# Patient Record
Sex: MALE | Race: WHITE | NOT HISPANIC OR LATINO | Employment: OTHER | URBAN - METROPOLITAN AREA
[De-identification: names, ages, dates, MRNs, and addresses within clinical notes are randomized per-mention and may not be internally consistent; named-entity substitution may affect disease eponyms.]

---

## 2019-10-16 ENCOUNTER — OFFICE VISIT (OUTPATIENT)
Dept: ORTHOPEDIC SURGERY | Facility: CLINIC | Age: 49
End: 2019-10-16

## 2019-10-16 VITALS
HEART RATE: 64 BPM | DIASTOLIC BLOOD PRESSURE: 86 MMHG | SYSTOLIC BLOOD PRESSURE: 125 MMHG | HEIGHT: 72 IN | BODY MASS INDEX: 30.75 KG/M2 | WEIGHT: 227 LBS

## 2019-10-16 DIAGNOSIS — M25.561 RIGHT KNEE PAIN, UNSPECIFIED CHRONICITY: Primary | ICD-10-CM

## 2019-10-16 DIAGNOSIS — M23.51 CHRONIC INSTABILITY OF RIGHT KNEE: ICD-10-CM

## 2019-10-16 PROBLEM — I10 ESSENTIAL HYPERTENSION: Status: ACTIVE | Noted: 2019-10-16

## 2019-10-16 PROBLEM — F41.9 ANXIETY: Status: ACTIVE | Noted: 2019-10-16

## 2019-10-16 PROBLEM — F43.10 PTSD (POST-TRAUMATIC STRESS DISORDER): Status: ACTIVE | Noted: 2019-10-16

## 2019-10-16 PROBLEM — J44.9 COPD (CHRONIC OBSTRUCTIVE PULMONARY DISEASE) (HCC): Status: ACTIVE | Noted: 2019-10-16

## 2019-10-16 PROCEDURE — 99203 OFFICE O/P NEW LOW 30 MIN: CPT | Performed by: FAMILY MEDICINE

## 2019-10-16 RX ORDER — CITALOPRAM 20 MG/1
20 TABLET ORAL DAILY
COMMUNITY
End: 2019-11-20

## 2019-10-16 RX ORDER — ROPINIROLE 5 MG/1
5 TABLET, FILM COATED ORAL NIGHTLY
COMMUNITY
End: 2019-12-04

## 2019-10-16 RX ORDER — NITROGLYCERIN 80 MG/1
1 PATCH TRANSDERMAL DAILY
COMMUNITY
End: 2019-11-20

## 2019-10-16 RX ORDER — CHOLECALCIFEROL (VITAMIN D3) 125 MCG
5 CAPSULE ORAL
COMMUNITY
End: 2020-01-29

## 2019-10-16 NOTE — PROGRESS NOTES
"Primary Care Sports Medicine Office Visit Note     Patient ID: Vince Urbina is a 49 y.o. male.    Chief Complaint:  Chief Complaint   Patient presents with   • Right Knee - Initial Evaluation     HPI:    Mr. Vince Urbina is a 49 y.o. male who presents to the clinic today for right knee pain and instability.  He states that he has no furniture at home and sits \" style\" frequently on the floor.  When getting up from this position, he has occasionally had some instability, which is causing him to fall back to the floor.  Denies any popping, clicking, or locking of the knee.  He denies any acute injury, or loud pop with any twisting injury or trauma.  He denies any previous history of injury to this knee.  Pain is deep and achy, points to the medial joint space.  He has attempted very rare naproxen over-the-counter, without any schedule or regularity.    Past Medical History:   Diagnosis Date   • Anxiety    • Asthma    • COPD (chronic obstructive pulmonary disease) (CMS/[facility])    • Essential hypertension 10/16/2019   • Heart attack (CMS/[facility])    • Hypertension    • Panic attack    • PTSD (post-traumatic stress disorder)    • Restless leg syndrome        Past Surgical History:   Procedure Laterality Date   • CATARACT EXTRACTION, BILATERAL     • EAR TUBES     • HAMMER TOE REPAIR     • TONSILLECTOMY AND ADENOIDECTOMY     • WRIST SURGERY Right        Family History   Problem Relation Age of Onset   • Hypertension Mother    • Diabetes Mother      Social History     Occupational History   • Not on file   Tobacco Use   • Smoking status: Never Smoker   Substance and Sexual Activity   • Alcohol use: No     Frequency: Never   • Drug use: Not on file   • Sexual activity: Not on file      Review of Systems   Constitutional: Negative for activity change and fever.   Musculoskeletal: Positive for arthralgias and gait problem (knee instability).   Skin: Negative for color change, rash and skin lesions.     Objective:    /86 (BP " "Location: Left arm, Patient Position: Sitting, Cuff Size: Adult)   Pulse 64   Ht 182.9 cm (72\")   Wt 103 kg (227 lb)   BMI 30.79 kg/m²     Physical Examination:  Physical Exam   Constitutional: He appears well-developed and well-nourished. No distress.   HENT:   Head: Normocephalic and atraumatic.   Eyes: Conjunctivae are normal.   Cardiovascular: Intact distal pulses.   Pulmonary/Chest: Effort normal. No respiratory distress.   Musculoskeletal:        Right knee: He exhibits no effusion.   Neurological: He is alert.   Skin: Skin is warm. Capillary refill takes less than 2 seconds. He is not diaphoretic.   Nursing note and vitals reviewed.    Right Ankle Exam     Range of Motion   The patient has normal right ankle ROM.      Right Knee Exam     Muscle Strength   The patient has normal right knee strength.    Tenderness   The patient is experiencing tenderness in the medial joint line and medial retinaculum (medial meniscal body).    Range of Motion   The patient has normal right knee ROM.  Extension: normal   Flexion: normal     Tests   Nataliia:  Medial - negative (moderate pain but no popping or clicking) Lateral - negative  Varus: negative Valgus: negative  Lachman:  Anterior - negative      Drawer:  Anterior - negative    Posterior - negative    Other   Erythema: absent  Sensation: normal (Distal to the knee. DP and PT palpable. )  Pulse: present  Swelling: none  Effusion: no effusion present      Right Hip Exam     Range of Motion   The patient has normal right hip ROM.          Imaging and other tests:  Three-view XR of the right knee shows well-preserved joint spaces, no osteophytic or sclerotic activity.  There is a moderate amount of hazy calcification in both medial and lateral joint spaces/likely meniscus of the right knee.  Otherwise no acute bony abnormality, no fracture or dislocation.    Assessment and Plan:    1. Right knee pain, unspecified chronicity  - XR Knee 3 View Right    2. Chronic " "instability of right knee    With calcification apparent on XR, I have no doubt that this patient likely has previous/chronic injury to menisci of this knee.  Currently without acute injury or trauma, I feel we should treat this conservatively.  We will move forward with hinged knee brace for stability, new prescription for meloxicam for anti-inflammatory benefit.  I would also like to see this patient attempted physical therapy, but he would rather trial knee brace first.  He can come back in 1 month to discuss Mobic efficacy, and we can consider physical therapy at that time.  Could also consider MRI for surgical evaluation if warranted.      Tung JACKSON \"Chance\" Zi ARAIZA DO, CAQSM  10/16/19  11:21 AM    Disclaimer: Please note that areas of this note were completed with computer voice recognition software.  Quite often unanticipated grammatical, syntax, homophones, and other interpretive errors are inadvertently transcribed by the computer software. Please excuse any errors that have escaped final proofreading.  "

## 2019-11-20 ENCOUNTER — OFFICE VISIT (OUTPATIENT)
Dept: ORTHOPEDIC SURGERY | Facility: CLINIC | Age: 49
End: 2019-11-20

## 2019-11-20 VITALS
DIASTOLIC BLOOD PRESSURE: 77 MMHG | WEIGHT: 223 LBS | HEART RATE: 80 BPM | HEIGHT: 72 IN | BODY MASS INDEX: 30.2 KG/M2 | SYSTOLIC BLOOD PRESSURE: 115 MMHG

## 2019-11-20 DIAGNOSIS — M23.51 CHRONIC INSTABILITY OF RIGHT KNEE: Primary | ICD-10-CM

## 2019-11-20 PROCEDURE — 99213 OFFICE O/P EST LOW 20 MIN: CPT | Performed by: FAMILY MEDICINE

## 2019-11-22 NOTE — PROGRESS NOTES
Primary Care Sports Medicine Office Visit Note     Patient ID: Vince Urbina is a 49 y.o. male.    Chief Complaint:  Chief Complaint   Patient presents with   • Right Knee - Follow-up     HPI:    Mr. Vince Urbina is a 49 y.o. male who presents to the clinic today for knee instability.  After being seen about a month ago, and attempting conservative measures, the patient still complains of instability of the knee.  He has been wearing a hinged knee brace for stability that seems to help.  He is taken meloxicam as instructed, and attempted some home physical therapy.  Unfortunately he denies any improvement in the instability.  He requests MRI.    Past Medical History:   Diagnosis Date   • Anxiety    • Asthma    • COPD (chronic obstructive pulmonary disease) (CMS/[facility])    • Essential hypertension 10/16/2019   • Heart attack (CMS/[facility])    • Hypertension    • Panic attack    • PTSD (post-traumatic stress disorder)    • Restless leg syndrome        Past Surgical History:   Procedure Laterality Date   • CATARACT EXTRACTION, BILATERAL     • EAR TUBES     • HAMMER TOE REPAIR     • TONSILLECTOMY AND ADENOIDECTOMY     • WRIST SURGERY Right        Family History   Problem Relation Age of Onset   • Hypertension Mother    • Diabetes Mother      Social History     Occupational History   • Not on file   Tobacco Use   • Smoking status: Never Smoker   Substance and Sexual Activity   • Alcohol use: No     Frequency: Never   • Drug use: Not on file   • Sexual activity: Not on file      Review of Systems   Constitutional: Negative for activity change and fever.   Respiratory: Negative for cough and shortness of breath.    Cardiovascular: Negative for chest pain.   Gastrointestinal: Negative for constipation, diarrhea, nausea and vomiting.   Musculoskeletal: Positive for arthralgias and gait problem.   Skin: Negative for color change and rash.   Neurological: Negative for weakness.   Hematological: Does not bruise/bleed easily.  "      Objective:    /77 (BP Location: Left arm, Patient Position: Sitting, Cuff Size: Adult)   Pulse 80   Ht 182.9 cm (72\")   Wt 101 kg (223 lb)   BMI 30.24 kg/m²     Physical Examination:  Physical Exam   Constitutional: He appears well-developed and well-nourished. No distress.   HENT:   Head: Normocephalic and atraumatic.   Eyes: Conjunctivae are normal.   Cardiovascular: Intact distal pulses.   Pulmonary/Chest: Effort normal. No respiratory distress.   Musculoskeletal:        Right knee: He exhibits no effusion.   Neurological: He is alert.   Skin: Skin is warm. Capillary refill takes less than 2 seconds. He is not diaphoretic.   Nursing note and vitals reviewed.    Right Ankle Exam     Range of Motion   The patient has normal right ankle ROM.      Right Knee Exam     Muscle Strength   The patient has normal right knee strength.    Tenderness   The patient is experiencing tenderness in the medial joint line.    Range of Motion   The patient has normal right knee ROM.  Extension: normal   Flexion: normal     Tests   Nataliia:  Medial - positive Lateral - negative  Varus: negative Valgus: negative  Lachman:  Anterior - negative      Drawer:  Anterior - negative    Posterior - negative    Other   Erythema: absent  Sensation: normal (Distal to the knee. DP and PT palpable. )  Pulse: present  Swelling: none  Effusion: no effusion present      Right Hip Exam     Range of Motion   The patient has normal right hip ROM.        Imaging and other tests:  No new imaging today.    Assessment and Plan:    1. Chronic instability of right knee  - MRI Knee Right Without Contrast; Future    With continued instability and calcific change on x-ray, we will go ahead move forward with MRI to further evaluate this patient's meniscal pathology.  I like to see him back in the next few days to discuss MRI results.      Tung JACKSON \"Chance\" Zi ARAIZA DO, CAQSM  11/22/19  9:14 AM    Disclaimer: Please note that areas of this note " were completed with computer voice recognition software.  Quite often unanticipated grammatical, syntax, homophones, and other interpretive errors are inadvertently transcribed by the computer software. Please excuse any errors that have escaped final proofreading.

## 2019-11-29 ENCOUNTER — HOSPITAL ENCOUNTER (OUTPATIENT)
Dept: MRI IMAGING | Facility: HOSPITAL | Age: 49
Discharge: HOME | End: 2019-11-29
Admitting: FAMILY MEDICINE

## 2019-11-29 DIAGNOSIS — M23.51 CHRONIC INSTABILITY OF RIGHT KNEE: ICD-10-CM

## 2019-11-29 PROCEDURE — 73721 MRI JNT OF LWR EXTRE W/O DYE: CPT

## 2019-12-04 ENCOUNTER — OFFICE VISIT (OUTPATIENT)
Dept: ORTHOPEDIC SURGERY | Facility: CLINIC | Age: 49
End: 2019-12-04

## 2019-12-04 VITALS
SYSTOLIC BLOOD PRESSURE: 142 MMHG | BODY MASS INDEX: 30.48 KG/M2 | WEIGHT: 225 LBS | DIASTOLIC BLOOD PRESSURE: 82 MMHG | HEIGHT: 72 IN | HEART RATE: 76 BPM

## 2019-12-04 DIAGNOSIS — M17.11 PRIMARY LOCALIZED OSTEOARTHROSIS OF RIGHT LOWER LEG: Primary | ICD-10-CM

## 2019-12-04 PROCEDURE — 99213 OFFICE O/P EST LOW 20 MIN: CPT | Performed by: FAMILY MEDICINE

## 2019-12-04 RX ORDER — ROPINIROLE 0.5 MG/1
1 TABLET, FILM COATED ORAL
Refills: 1 | COMMUNITY
Start: 2019-11-19 | End: 2020-11-09

## 2019-12-04 RX ORDER — CITALOPRAM 40 MG/1
40 TABLET ORAL DAILY
Refills: 3 | COMMUNITY
Start: 2019-11-25 | End: 2020-01-29

## 2019-12-04 NOTE — PROGRESS NOTES
Primary Care Sports Medicine Office Visit Note     Patient ID: Vince Urbina is a 49 y.o. male.    Chief Complaint:  Chief Complaint   Patient presents with   • Right Knee - Follow-up     HPI:    Mr. Vince Urbina is a 49 y.o. male who presents to the clinic today for MRI results.  The patient was fairly adamant previously that an MRI was warranted with some instability that he is having.  We discussed the findings on his x-ray of very mild joint space narrowing, but with some calcific change to medial and lateral menisci of the right knee.  He states his right knee continues to be symptomatic and is causing significant and severe amount of pain that is debilitating to his life.  He continues to claim there are both menisci and ligamentous problems of his knee.  We discussed that there are no ligamentous injuries to his knee on MRI however.  The patient is adamant that he needs to see a surgeon.    Past Medical History:   Diagnosis Date   • Anxiety    • Asthma    • COPD (chronic obstructive pulmonary disease) (CMS/[facility])    • Essential hypertension 10/16/2019   • Heart attack (CMS/[facility])    • Hypertension    • Panic attack    • PTSD (post-traumatic stress disorder)    • Restless leg syndrome        Past Surgical History:   Procedure Laterality Date   • CATARACT EXTRACTION, BILATERAL     • EAR TUBES     • HAMMER TOE REPAIR     • TONSILLECTOMY AND ADENOIDECTOMY     • WRIST SURGERY Right        Family History   Problem Relation Age of Onset   • Hypertension Mother    • Diabetes Mother      Social History     Occupational History   • Not on file   Tobacco Use   • Smoking status: Never Smoker   • Smokeless tobacco: Never Used   Substance and Sexual Activity   • Alcohol use: No     Frequency: Never   • Drug use: Defer   • Sexual activity: Defer      Review of Systems   Constitutional: Negative for activity change and fever.   Respiratory: Negative for cough and shortness of breath.    Cardiovascular: Negative for chest pain.  "  Gastrointestinal: Negative for constipation, diarrhea, nausea and vomiting.   Musculoskeletal: Positive for arthralgias.   Skin: Negative for color change and rash.   Neurological: Negative for weakness.   Hematological: Does not bruise/bleed easily.       Objective:    /82   Pulse 76   Ht 182.9 cm (72\")   Wt 102 kg (225 lb)   BMI 30.52 kg/m²     Physical Examination:  Physical Exam   Constitutional: He appears well-developed and well-nourished. No distress.   HENT:   Head: Normocephalic and atraumatic.   Eyes: Conjunctivae are normal.   Cardiovascular: Intact distal pulses.   Pulmonary/Chest: Effort normal. No respiratory distress.   Musculoskeletal:        Right knee: He exhibits no effusion.   Neurological: He is alert.   Skin: Skin is warm. Capillary refill takes less than 2 seconds. He is not diaphoretic.   Nursing note and vitals reviewed.    Right Knee Exam     Muscle Strength   The patient has normal right knee strength.    Tenderness   The patient is experiencing tenderness in the lateral joint line, medial joint line and patella.    Range of Motion   Right knee extension: mildly decreased end ROM.   Right knee flexion: mildly decreased end ROM.     Tests   Nataliia:  Medial - negative Lateral - negative  Varus: negative Valgus: negative  Right knee patellar apprehension test: +patellar grind, +mustapha figueredo.    Other   Erythema: absent  Sensation: normal  Pulse: present (distal to the knee, DP and PT palpable)  Swelling: none  Effusion: no effusion present          Imaging and other tests:  MRI right knee dated 11/20/2019 impression:   1. Tricompartmental osteoarthritis, most pronounced within the medial  compartment.  2. Degenerative appearing tearing of the medial meniscus involving the  body and the posterior horn..    Assessment and Plan:    1. Primary localized osteoarthrosis of right lower leg    I attempted to discuss with this patient his pathology, but he is unwilling to have this " "discussion as he is certain that there is a ligamentous injury in his knee. He was offered corticosteroid and HA injections both today that he denies. I tried multiple times to discuss the arthritic changes that are present only.  He does have some mild degenerative change of his meniscus as well.  He was unwavering in the fact that he needs to see a surgeon to discuss total knee replacement.  I am not convinced that this is the best treatment for him, but would be happy to get him with my surgical counterpart Dr. Liu Garcia in this office for surgical evaluation.  Should he feel that surgery is not yet an option, I be happy to see him again and repeat discussion on corticosteroid and/or hyaluronic acid injection for his osteoarthritic pathology.      Tung JACKSON \"Chance\" Zi ARAIZA DO, CAQSM  12/04/19  2:54 PM    Disclaimer: Please note that areas of this note were completed with computer voice recognition software.  Quite often unanticipated grammatical, syntax, homophones, and other interpretive errors are inadvertently transcribed by the computer software. Please excuse any errors that have escaped final proofreading.  "

## 2019-12-11 ENCOUNTER — OFFICE VISIT (OUTPATIENT)
Dept: ORTHOPEDIC SURGERY | Facility: CLINIC | Age: 49
End: 2019-12-11

## 2019-12-11 VITALS
HEART RATE: 83 BPM | HEIGHT: 72 IN | BODY MASS INDEX: 30.48 KG/M2 | SYSTOLIC BLOOD PRESSURE: 151 MMHG | DIASTOLIC BLOOD PRESSURE: 93 MMHG | WEIGHT: 225 LBS

## 2019-12-11 DIAGNOSIS — M17.0 BILATERAL PRIMARY OSTEOARTHRITIS OF KNEE: Primary | ICD-10-CM

## 2019-12-11 PROCEDURE — 99213 OFFICE O/P EST LOW 20 MIN: CPT | Performed by: ORTHOPAEDIC SURGERY

## 2019-12-11 NOTE — PROGRESS NOTES
"     Patient ID: Vince Urbina is a 49 y.o. male.  Bilateral knee pain  Vince is a 49-year-old gentleman I partner is seen for knee pain that is been present for most of this year.  There is no injury.  He has activity related knee pain in both knees which is worse than the right.  They swell at times.  No real mechanical complaint.  They have not responded to anti-inflammatories or rest.  Pain is down to 4/10    Review of Systems:  Bilateral knee pain  Denies chest pain      Objective:    /93   Pulse 83   Ht 182.9 cm (72\")   Wt 102 kg (225 lb)   BMI 30.52 kg/m²     Physical Examination:   He is a pleasant male in no distress. He is alert and oriented x3 and appears his stated age.  Both knees demonstrate no scars and no atrophy.  There are no effusions.  He has mild medial joint line tenderness worse on the right than the left.  Knee range of motion bilaterally 0 to 135 degrees with no instability negative Nataliia.Sensory and motor exam are intact all distributions. Dorsalis pedis and posterior tibialis pulses are palpable and capillary refill is less than two seconds to all digits      Imaging:   x-rays demonstrate well-maintained joint spaces of both knees with chondrocalcinosis of the right knee  MRI of the right knee demonstrates mild to moderate degenerative joint disease with a small medial meniscus tear    Assessment:    Bilateral knee pain and degenerative joint disease    Plan:  Options discussed.  I recommend Visco supplementation in both knees          Disclaimer: Please note that areas of this note were completed with computer voice recognition software.  Quite often unanticipated grammatical, syntax, homophones, and other interpretive errors are inadvertently transcribed by the computer software. Please excuse any errors that have escaped final proofreading.  "

## 2019-12-16 ENCOUNTER — TELEPHONE (OUTPATIENT)
Dept: ORTHOPEDIC SURGERY | Facility: CLINIC | Age: 49
End: 2019-12-16

## 2019-12-16 NOTE — TELEPHONE ENCOUNTER
Called to check status on SOB for monovisc, they needed more info about his medicaid.  She stated that she would call right away to check it.

## 2019-12-17 NOTE — TELEPHONE ENCOUNTER
Called patient went over SOB and wants  to go ahead with monovisc inj for both knees. Transferred patient to make appointment. Will order injs.

## 2019-12-26 ENCOUNTER — OFFICE VISIT (OUTPATIENT)
Dept: ORTHOPEDIC SURGERY | Facility: CLINIC | Age: 49
End: 2019-12-26

## 2019-12-26 VITALS
DIASTOLIC BLOOD PRESSURE: 103 MMHG | SYSTOLIC BLOOD PRESSURE: 136 MMHG | BODY MASS INDEX: 30.48 KG/M2 | HEIGHT: 72 IN | HEART RATE: 96 BPM | WEIGHT: 225 LBS

## 2019-12-26 DIAGNOSIS — M17.0 BILATERAL PRIMARY OSTEOARTHRITIS OF KNEE: Primary | ICD-10-CM

## 2019-12-26 PROCEDURE — 20610 DRAIN/INJ JOINT/BURSA W/O US: CPT | Performed by: ORTHOPAEDIC SURGERY

## 2019-12-26 NOTE — PROGRESS NOTES
"     Patient ID: Vince Urbina is a 49 y.o. male.    Bilateral knee pain    Objective:    BP (!) 136/103   Pulse 96   Ht 182.9 cm (72\")   Wt 102 kg (225 lb)   BMI 30.52 kg/m²     Physical Examination:    Both knees demonstrate no redness trace effusion    Imaging:      Assessment:  Bilateral knee degenerative joint disease    Plan:  Risks and benefits of the injection were discussed. Under sterile technique and written consent I injected one syringe of monovisc into each knee. It was well tolerated. Postinjection instructions were given  "

## 2020-01-29 ENCOUNTER — OFFICE VISIT (OUTPATIENT)
Dept: ORTHOPEDIC SURGERY | Facility: CLINIC | Age: 50
End: 2020-01-29

## 2020-01-29 VITALS
DIASTOLIC BLOOD PRESSURE: 87 MMHG | WEIGHT: 220 LBS | BODY MASS INDEX: 29.8 KG/M2 | SYSTOLIC BLOOD PRESSURE: 131 MMHG | HEART RATE: 73 BPM | HEIGHT: 72 IN

## 2020-01-29 DIAGNOSIS — M25.561 ACUTE PAIN OF RIGHT KNEE: Primary | ICD-10-CM

## 2020-01-29 PROCEDURE — 99213 OFFICE O/P EST LOW 20 MIN: CPT | Performed by: ORTHOPAEDIC SURGERY

## 2020-01-29 NOTE — PROGRESS NOTES
"     Patient ID: Vince Urbina is a 49 y.o. male.  Knee pain  Vince has had resolution of his left knee pain after injections, his right knee continues to feel a little bit weak with giving out.  He has suffered a couple falls on the stairs.  He was in an immobilizer.  Most of the pain is over the medial joint space    Review of Systems:    Right knee pain  Denies chest pain    Objective:    /87 (BP Location: Right arm, Patient Position: Sitting, Cuff Size: Large Adult)   Pulse 73   Ht 182.9 cm (72\")   Wt 99.8 kg (220 lb)   BMI 29.84 kg/m²     Physical Examination:   He is a pleasant male in no distress. He is alert and oriented x3 and appears his stated age.  Right knee demonstrates no effusion, mild medial joint line tenderness.  Range of motion 20 to 120 degrees with intact extensor mechanism and no varus or valgus instability.  Lachman, anterior, posterior drawer negative with negative Nataliia's.  Sensory and motor exam are intact all distributions. Dorsalis pedis and posterior tibialis pulses are palpable and capillary refill is less than two seconds to all digits      Imaging:       Assessment:    Right knee pain and arthritis    Plan:  I recommend a knee brace as well as physical therapy.  See me in 6 weeks        Disclaimer: Please note that areas of this note were completed with computer voice recognition software.  Quite often unanticipated grammatical, syntax, homophones, and other interpretive errors are inadvertently transcribed by the computer software. Please excuse any errors that have escaped final proofreading.  "

## 2020-02-03 ENCOUNTER — APPOINTMENT (OUTPATIENT)
Dept: GENERAL RADIOLOGY | Facility: HOSPITAL | Age: 50
End: 2020-02-03

## 2020-02-03 ENCOUNTER — HOSPITAL ENCOUNTER (EMERGENCY)
Facility: HOSPITAL | Age: 50
Discharge: HOME | End: 2020-02-03
Admitting: EMERGENCY MEDICINE

## 2020-02-03 VITALS
WEIGHT: 219.14 LBS | RESPIRATION RATE: 16 BRPM | HEART RATE: 77 BPM | BODY MASS INDEX: 29.68 KG/M2 | HEIGHT: 72 IN | TEMPERATURE: 98.2 F | OXYGEN SATURATION: 98 % | SYSTOLIC BLOOD PRESSURE: 135 MMHG | DIASTOLIC BLOOD PRESSURE: 99 MMHG

## 2020-02-03 DIAGNOSIS — M25.521 RIGHT ELBOW PAIN: Primary | ICD-10-CM

## 2020-02-03 DIAGNOSIS — M79.631 RIGHT FOREARM PAIN: ICD-10-CM

## 2020-02-03 PROCEDURE — 99283 EMERGENCY DEPT VISIT LOW MDM: CPT

## 2020-02-03 PROCEDURE — 73090 X-RAY EXAM OF FOREARM: CPT

## 2020-02-03 RX ORDER — CITALOPRAM 10 MG/1
10 TABLET ORAL DAILY
COMMUNITY
End: 2022-07-12

## 2020-02-03 RX ORDER — IBUPROFEN 400 MG/1
800 TABLET ORAL ONCE
Status: COMPLETED | OUTPATIENT
Start: 2020-02-03 | End: 2020-02-03

## 2020-02-03 RX ADMIN — IBUPROFEN 800 MG: 400 TABLET ORAL at 20:50

## 2020-02-04 NOTE — DISCHARGE INSTRUCTIONS
Wear shoulder sling for the next 3 to 5 days as needed for pain.  Perform range of motion exercises 2-3 times daily.  Apply ice to your right elbow for 20 minutes at a time for the neck 72 hours help with pain and swelling.  Take ibuprofen as needed for pain.    Follow-up with your primary care provider in 3-5 days.  If you do not have a primary care provider call [phone] for help in finding one, or you may follow up with [facility] at [phone].    Return to ED for any new or worsening symptoms    Follow-up with orthopedics as listed below if your pain continues

## 2020-02-04 NOTE — ED TRIAGE NOTES
Pt reports had rt arm closed in car door, c/o pain from elbow to wrist with tingling noted to elbow.

## 2020-02-04 NOTE — ED PROVIDER NOTES
Subjective   History of Present Illness  Patient is a 49-year-old male who presents with complaints of right elbow to wrist pain after his arm was caught in a car door just prior to arrival to the ED.  Patient describes his pain as a sharp shooting pain that he rates a 9/10 severity.  Reports some associated paresthesias in his arm at times.  Worse with certain movements better with rest denies any weakness.  right wrist surgery in March 2019.  States he is taken no medication for the pain denies any other alleviating or exacerbating factors.  Denies any chest pain or shortness of breath  Review of Systems   Constitutional: Negative.    Respiratory: Negative.    Cardiovascular: Negative.    Musculoskeletal: Positive for arthralgias and myalgias. Negative for joint swelling, neck pain and neck stiffness.   Skin: Negative.    Neurological: Positive for numbness. Negative for dizziness, seizures, facial asymmetry, weakness, light-headedness and headaches.       Past Medical History:   Diagnosis Date   • Anxiety    • Asthma    • COPD (chronic obstructive pulmonary disease) (CMS/[facility])    • Essential hypertension 10/16/2019   • Heart attack (CMS/[facility])    • Hypertension    • Panic attack    • PTSD (post-traumatic stress disorder)    • Restless leg syndrome        Allergies   Allergen Reactions   • Codeine Shortness Of Breath   • Aspirin GI Intolerance   • Ciprofloxacin Nausea And Vomiting   • Dilaudid [Hydromorphone] Anxiety and Palpitations   • Toradol [Ketorolac Tromethamine] Anxiety and Palpitations   • Tramadol Anxiety and Palpitations       Past Surgical History:   Procedure Laterality Date   • CATARACT EXTRACTION, BILATERAL     • EAR TUBES     • HAMMER TOE REPAIR     • TONSILLECTOMY AND ADENOIDECTOMY     • WRIST SURGERY Right        Family History   Problem Relation Age of Onset   • Hypertension Mother    • Diabetes Mother        Social History     Socioeconomic History   • Marital status: Legally      Spouse  name: Not on file   • Number of children: Not on file   • Years of education: Not on file   • Highest education level: Not on file   Tobacco Use   • Smoking status: Never Smoker   • Smokeless tobacco: Never Used   Substance and Sexual Activity   • Alcohol use: No     Frequency: Never   • Drug use: Defer   • Sexual activity: Defer           Objective   Physical Exam   Constitutional: He is oriented to person, place, and time. He appears well-developed and well-nourished. No distress.   HENT:   Head: Normocephalic and atraumatic.   Mouth/Throat: Oropharynx is clear and moist.   Eyes: Pupils are equal, round, and reactive to light. EOM are normal. No scleral icterus.   Cardiovascular: Normal rate, regular rhythm, normal heart sounds and intact distal pulses. Exam reveals no gallop and no friction rub.   No murmur heard.  Pulmonary/Chest: Effort normal and breath sounds normal. No stridor. He has no wheezes. He has no rales.   Musculoskeletal: He exhibits tenderness. He exhibits no edema or deformity.        Right elbow: Tenderness found.        Right wrist: He exhibits tenderness.        Right forearm: He exhibits tenderness.   Decreased range of motion with flexion extension supination and pronation of right elbow, forearm, wrist secondary to pain there is no overlying erythema edema or warmth noted no laceration noted.  Peripheral pulses intact compartment soft.  Radial medial ulnar nerve intact normal sensation throughout    Normal range of motion of right hand with no overlying erythema edema or warmth noted   Neurological: He is alert and oriented to person, place, and time. No cranial nerve deficit or sensory deficit.   Skin: Skin is warm. Capillary refill takes less than 2 seconds. No rash noted. He is not diaphoretic. No erythema. No pallor.   Psychiatric: He has a normal mood and affect. His behavior is normal.   Nursing note and vitals reviewed.      Procedures           ED Course    /96   Pulse 74    "Temp 98.3 °F (36.8 °C) (Oral)   Resp 18   Ht 182.9 cm (72\")   Wt 99.4 kg (219 lb 2.2 oz)   SpO2 99%   BMI 29.72 kg/m²   Medications   ibuprofen (ADVIL,MOTRIN) tablet 800 mg (800 mg Oral Given 2/3/20 2050)     Xr Forearm 2 View Right    Result Date: 2/3/2020  1. Distal radial and ulnar hardware without evidence of complication. 2. No acute fracture or dislocation.  Electronically Signed By-Jason Pelaez On:2/3/2020 9:07 PM This report was finalized on [id number] by  Jason Pelaez .                                               MDM  Number of Diagnoses or Management Options  Diagnosis management comments: Chart Review:  Comorbidity: Anxiety asthma COPD hypertension history of heart attack  Differentials: Fracture dislocation contusion sprain strain     ;this list is not all inclusive and does not constitute the entirety of considered causes  Imaging: Was interpreted by physician and reviewed by myself:  Xr Forearm 2 View Right  Result Date: 2/3/2020  1. Distal radial and ulnar hardware without evidence of complication. 2. No acute fracture or dislocation.  Electronically Signed ByIvy Pelaez On:2/3/2020 9:07 PM This report was finalized on [id number] by  Jason Pelaez, .    Disposition/Treatment:  While in the ED patient was given ibuprofen for his pain x-ray as described above showed no acute osseous abnormalities of elbow, forearm, wrist no hardware complication.  Patient was placed in a sling he was distally neurovascularly intact after placement good capillary refill.  He was advised follow-up primary care provider and orthopedics.  Lab results and findings were discussed with the patient and family at bedside who voiced understanding of discharge instructions along with signs and symptoms requiring return to the ED.  Upon discharged patient was in stable condition with followup for a revaluation.       Final diagnoses:   Right elbow pain   Right forearm pain            Geoffrey Martin " ROJELIO Salgado  02/03/20 2142

## 2020-02-10 ENCOUNTER — TREATMENT (OUTPATIENT)
Dept: PHYSICAL THERAPY | Facility: CLINIC | Age: 50
End: 2020-02-10

## 2020-02-10 DIAGNOSIS — R26.2 DIFFICULTY IN WALKING: ICD-10-CM

## 2020-02-10 DIAGNOSIS — M25.561 ACUTE PAIN OF RIGHT KNEE: Primary | ICD-10-CM

## 2020-02-10 PROCEDURE — 97140 MANUAL THERAPY 1/> REGIONS: CPT | Performed by: PHYSICAL THERAPIST

## 2020-02-10 PROCEDURE — 97162 PT EVAL MOD COMPLEX 30 MIN: CPT | Performed by: PHYSICAL THERAPIST

## 2020-02-10 NOTE — PROGRESS NOTES
Physical Therapy Initial Evaluation and Plan of Care    Patient: Vince Urbina   : 1970  Diagnosis/ICD-10 Code:  Acute pain of right knee [M25.561]  Referring practitioner: Liu Garcia, *  Date of Initial Visit: 2/10/2020  Today's Date: 2/10/2020  Patient seen for 1 sessions           Subjective Questionnaire: LEFS: 19      Subjective Evaluation    History of Present Illness  Mechanism of injury: Patient reports that he has pain in both knees but his right knee is worse. Patient started having pain around 4 months ago with insidious onset. He has had injections in both knees but is still having pain in the knees following. Standing from a seated position, walking for long periods of time, descending stairs, and sleeping throughout the night tends to aggravate symptoms into the knee. Walking around, sitting, and heat tends to reduce symptoms into the knee. Pain is located in the front of the knee and the medial and lateral aspects of the knee.      Quality of life: good    Pain  Current pain ratin  At best pain ratin  At worst pain rating: 10  Quality: throbbing, dull ache, tight, sharp and discomfort  Relieving factors: heat, rest and relaxation  Aggravating factors: ambulation, squatting, lifting, stairs, prolonged positioning, movement, standing and sleeping  Progression: no change    Social Support  Lives in: apartment    Treatments  Previous treatment: injection treatment  Patient Goals  Patient goals for therapy: decreased edema, decreased pain, improved balance, increased motion, increased strength, independence with ADLs/IADLs and return to sport/leisure activities             Objective       Palpation     Right   Hypertonic in the lateral gastrocnemius, rectus femoris, vastus lateralis and vastus medialis. Tenderness of the lateral gastrocnemius, rectus femoris, vastus lateralis and vastus medialis.     Tenderness     Right Knee   Tenderness in the inferior patella, lateral joint  line, medial joint line, patellar tendon and superior patella.     Active Range of Motion   Left Knee   Normal active range of motion    Right Knee   Flexion: 100 degrees with pain  Extensor la degrees with pain    Patellar Mobility     Right Knee Hypomobile in the medial, superior and inferior patellar tendon(s).     Strength/Myotome Testing     Left Hip   Planes of Motion   Flexion: 5  Abduction: 4-    Right Hip   Planes of Motion   Flexion: 4+  Abduction: 4-    Left Knee   Flexion: 4  Extension: 4+    Right Knee   Flexion: 4-  Extension: 4    Left Ankle/Foot   Dorsiflexion: 5    Right Ankle/Foot   Dorsiflexion: 5    Tests     Right Knee   Positive patellar compression.     Ambulation     Quality of Movement During Gait     Knee    Knee (Right): Positive stiff knee and quad avoidance.          Assessment & Plan     Assessment  Impairments: abnormal coordination, abnormal gait, abnormal muscle firing, abnormal muscle tone, abnormal or restricted ROM, activity intolerance, impaired balance, impaired physical strength, lacks appropriate home exercise program, pain with function and safety issue  Assessment details: The patient is a 49 y.o. male who presents to physical therapy today for right knee pain. Upon initial evaluation, the patient demonstrates the following impairments: increased pain, increased muscle tone, an antalgic gait pattern, decreased strength, decreased joint mobility, and decreased ROM. Due to these impairments, the patient is unable to stand for long periods of time, walk for long periods of time, sleep throughout the night, and stand for a sitting position. The patient would benefit from skilled PT services to address functional limitations and impairments and to improve patient quality of life.      Prognosis: good  Functional Limitations: carrying objects, lifting, walking, pushing, uncomfortable because of pain, sitting, standing and stooping  Goals  Plan Goals: STG's: 2 weeks   1.  Patient will report pain level at worse </= 7/10 for improved tolerance to ADLs and functional mobility  2. Patient will require <3 tactile or verbal cues for proper mechanics during completion of his HEP      LTG's: By Discharge  1. Patient will report pain levels at worst </= 3/10 for improved tolerance to ADLs and functional mobility  2. Patient will be able to ambulate unlimited distances without an assistive device and no increased pain  3. Patient will be able to complete single leg stance on stable surface with no UE support, with supervision for durations > 30 seconds on RLE to decrease risk of falls  4. Patient will report improved levels of overall function as demonstrated by an increase in his reported level of function score on the LEFS to >/= 50/80    5. Patient will require no tactile or verbal cues for proper mechaics during completion of his HEP for final independence       Plan  Therapy options: will be seen for skilled physical therapy services  Planned modality interventions: cryotherapy, electrical stimulation/Russian stimulation, TENS and thermotherapy (hydrocollator packs)  Planned therapy interventions: ADL retraining, balance/weight-bearing training, flexibility, functional ROM exercises, gait training, home exercise program, IADL retraining, joint mobilization, manual therapy, neuromuscular re-education, soft tissue mobilization, spinal/joint mobilization, strengthening, stretching and therapeutic activities  Duration in visits: 12  Treatment plan discussed with: patient        History # of Personal Factors and/or Comorbidities: MODERATE (1-2)  Examination of Body System(s): # of elements: MODERATE (3)  Clinical Presentation: EVOLVING  Clinical Decision Making: MODERATE      Timed:         Manual Therapy:    10     mins  25802;     Therapeutic Exercise:    8     mins  67073;     Neuromuscular Humberto:        mins  59601;    Therapeutic Activity:          mins  41724;     Gait Training:            mins  13428;     Ultrasound:          mins  28332;    Ionto                                   mins   24777  Self Care                            mins   78913  Canalith Repos         mins 52257      Un-Timed:  Electrical Stimulation:         mins  60933 ( );  Dry Needling          mins self-pay  Traction          mins 63689  Low Eval          Mins  72606  Mod Eval     25     Mins  71668  High Eval                            Mins  17325  Re-Eval                               mins  96848        Timed Treatment:   18   mins   Total Treatment:     43   mins    PT SIGNATURE: Mel Scott, TIN   DATE TREATMENT INITIATED: 2/10/2020    Medicare Initial Certification  Certification Period: 5/10/2020  I certify that the therapy services are furnished while this patient is under my care.  The services outlined above are required by this patient, and will be reviewed every 90 days.     PHYSICIAN: Liu Garcia MD      DATE:     Please sign and return via fax to [phone].. Thank you, [facility].

## 2020-02-19 ENCOUNTER — TREATMENT (OUTPATIENT)
Dept: PHYSICAL THERAPY | Facility: CLINIC | Age: 50
End: 2020-02-19

## 2020-02-19 DIAGNOSIS — R26.2 DIFFICULTY IN WALKING: ICD-10-CM

## 2020-02-19 DIAGNOSIS — M25.561 ACUTE PAIN OF RIGHT KNEE: Primary | ICD-10-CM

## 2020-02-19 PROCEDURE — 97140 MANUAL THERAPY 1/> REGIONS: CPT | Performed by: PHYSICAL THERAPIST

## 2020-02-19 PROCEDURE — 97110 THERAPEUTIC EXERCISES: CPT | Performed by: PHYSICAL THERAPIST

## 2020-02-19 NOTE — PROGRESS NOTES
Physical Therapy Daily Progress Note    VISIT#: 2    Subjective   Vince Urbina reports that he twisted his knee in his sleep last night and he woke up almost screaming in pain last night. He is having a lot of pain today.  Pain Rating: 10    Objective     See Exercise, Manual, and Modality Logs for complete treatment.     Patient Education: importance of movement to reduce muscle tone and pain, HEP    Assessment & Plan     Assessment  Assessment details: The patient presents to therapy today with high levels of pain. Patient was given heat prior to therapy to reduce pain. He was able to tolerate myofascial release of the vastus lateralis and rectus femoris as well as mobilization of the patella. Patient and PT discussed importance of ROM and exercises to reduce pain and improve ROM.        Progress per Plan of Care and Progress strengthening /stabilization /functional activity          Timed:         Manual Therapy:    23     mins  62886;     Therapeutic Exercise:    15     mins  41717;     Neuromuscular Humberto:        mins  53585;    Therapeutic Activity:          mins  70100;     Gait Training:           mins  81496;     Ultrasound:          mins  78965;    Ionto                                   mins   18621  Self Care                            mins   95125  Canalith Repos                   mins  04578    Un-Timed:  Electrical Stimulation:         mins  15737 ( );  Dry Needling          mins self-pay  Traction          mins 33733  Low Eval          Mins  88850  Mod Eval          Mins  74151  High Eval                            Mins  72459  Re-Eval                               mins  34021    Timed Treatment:   38   mins   Total Treatment:     53   mins    Mel Scott PT  Physical Therapist  [place] License # [id number]

## 2020-02-26 ENCOUNTER — TREATMENT (OUTPATIENT)
Dept: PHYSICAL THERAPY | Facility: CLINIC | Age: 50
End: 2020-02-26

## 2020-02-26 DIAGNOSIS — R26.2 DIFFICULTY IN WALKING: ICD-10-CM

## 2020-02-26 DIAGNOSIS — M25.561 ACUTE PAIN OF RIGHT KNEE: Primary | ICD-10-CM

## 2020-02-26 PROCEDURE — 97110 THERAPEUTIC EXERCISES: CPT | Performed by: PHYSICAL THERAPIST

## 2020-02-26 PROCEDURE — 97140 MANUAL THERAPY 1/> REGIONS: CPT | Performed by: PHYSICAL THERAPIST

## 2020-02-26 NOTE — PROGRESS NOTES
Physical Therapy Daily Progress Note    VISIT#: 3    Subjective   Vince Urbina reports that he had relief for about an hour after therapy the last session. He woke up with a lot of pain in the knee today.  Pain Rating: 10    Objective     See Exercise, Manual, and Modality Logs for complete treatment.     Patient Education: HEP, importance of ROM    Assessment & Plan     Assessment  Assessment details: The patient was able to achieve full ROM in the right knee at today's visit with no increase in symptoms. He continues to demonstrate muscle guarding and hypomobility in the patella which could be contributing to pain with ambulation.        Progress per Plan of Care and Progress strengthening /stabilization /functional activity          Timed:         Manual Therapy:    23     mins  73441;     Therapeutic Exercise:    15     mins  88650;     Neuromuscular Humberto:        mins  13964;    Therapeutic Activity:          mins  55398;     Gait Training:           mins  43385;     Ultrasound:          mins  63653;    Ionto                                   mins   26132  Self Care                            mins   15305  Canalith Repos                   mins  00428    Un-Timed:  Electrical Stimulation:         mins  27612 ( );  Dry Needling          mins self-pay  Traction          mins 71541  Low Eval          Mins  68923  Mod Eval          Mins  29145  High Eval                            Mins  73183  Re-Eval                               mins  30467    Timed Treatment:   38   mins   Total Treatment:     52   mins    Mel Scott PT  Physical Therapist  [place] License # [id number]

## 2020-03-10 ENCOUNTER — TREATMENT (OUTPATIENT)
Dept: PHYSICAL THERAPY | Facility: CLINIC | Age: 50
End: 2020-03-10

## 2020-03-10 DIAGNOSIS — R26.2 DIFFICULTY IN WALKING: Primary | ICD-10-CM

## 2020-03-10 DIAGNOSIS — M25.561 ACUTE PAIN OF RIGHT KNEE: ICD-10-CM

## 2020-03-10 PROCEDURE — 97110 THERAPEUTIC EXERCISES: CPT | Performed by: PHYSICAL THERAPIST

## 2020-03-10 PROCEDURE — 97140 MANUAL THERAPY 1/> REGIONS: CPT | Performed by: PHYSICAL THERAPIST

## 2020-03-10 NOTE — PROGRESS NOTES
Physical Therapy Daily Progress Note    VISIT#: 4    Subjective   Vince Urbina reports that his knee pain has decreased a little bit. He is able to sleep better and isn't wearing his brace today.  Pain Rating: did not report a number today    Objective     See Exercise, Manual, and Modality Logs for complete treatment.     Patient Education: HEP, importance of movement, use of heat    Assessment & Plan     Assessment  Assessment details: The patient has demonstrated an improvement in ROM and pain reduction since the previous visit. His patellar mobility has improved as well. Patient tolerated treatment and exercises well with no increase in pain.        Progress per Plan of Care and Progress strengthening /stabilization /functional activity          Timed:         Manual Therapy:    15     mins  94217;     Therapeutic Exercise:    20     mins  57011;     Neuromuscular Humberto:        mins  88710;    Therapeutic Activity:          mins  95496;     Gait Training:           mins  16625;     Ultrasound:          mins  84528;    Ionto                                   mins   48709  Self Care                            mins   46427  Canalith Repos                   mins  53920    Un-Timed:  Electrical Stimulation:         mins  21479 ( );  Dry Needling          mins self-pay  Traction          mins 45994  Low Eval          Mins  23476  Mod Eval          Mins  75745  High Eval                            Mins  83299  Re-Eval                               mins  47510    Timed Treatment:   35   mins   Total Treatment:     45   mins    Mel Scott PT  Physical Therapist  [place] License # [id number]

## 2020-03-17 ENCOUNTER — TREATMENT (OUTPATIENT)
Dept: PHYSICAL THERAPY | Facility: CLINIC | Age: 50
End: 2020-03-17

## 2020-03-17 DIAGNOSIS — M25.561 ACUTE PAIN OF RIGHT KNEE: ICD-10-CM

## 2020-03-17 DIAGNOSIS — R26.2 DIFFICULTY IN WALKING: Primary | ICD-10-CM

## 2020-03-17 PROCEDURE — 97530 THERAPEUTIC ACTIVITIES: CPT | Performed by: PHYSICAL THERAPIST

## 2020-03-17 PROCEDURE — 97110 THERAPEUTIC EXERCISES: CPT | Performed by: PHYSICAL THERAPIST

## 2020-03-17 PROCEDURE — 97140 MANUAL THERAPY 1/> REGIONS: CPT | Performed by: PHYSICAL THERAPIST

## 2020-03-17 NOTE — PROGRESS NOTES
Physical Therapy Daily Progress Note    VISIT#: 5    Subjective   Vince Urbina reports that his right knee is still painful but his left one is hurting even more today. The right one is still hurting though.  Pain Ratin    Objective     See Exercise, Manual, and Modality Logs for complete treatment.     Patient Education: exercise cueing and rationale    Assessment & Plan     Assessment  Assessment details: The patient tolerated treatment well today. He was able to demonstrate moderately quick speed on the bike with no appearance of discomfort or pain. The patient continues to demonstrate ROM increases with pain reduction.        Progress per Plan of Care and Progress strengthening /stabilization /functional activity          Timed:         Manual Therapy:    18     mins  92685;     Therapeutic Exercise:    12     mins  72683;     Neuromuscular Humberto:        mins  05266;    Therapeutic Activity:     10     mins  40177;     Gait Training:           mins  84875;     Ultrasound:          mins  81162;    Ionto                                   mins   09095  Self Care                            mins   24147  Canalith Repos                   mins  82753    Un-Timed:  Electrical Stimulation:         mins  89598 ( );  Dry Needling          mins self-pay  Traction          mins 14148  Low Eval          Mins  93397  Mod Eval          Mins  47465  High Eval                            Mins  98759  Re-Eval                               mins  57560    Timed Treatment:  40    mins   Total Treatment:     45   mins    Mel Scott PT  Physical Therapist  [place] License # [id number]

## 2020-04-22 ENCOUNTER — PREP FOR SURGERY (OUTPATIENT)
Dept: OTHER | Facility: HOSPITAL | Age: 50
End: 2020-04-22

## 2020-04-22 ENCOUNTER — OFFICE VISIT (OUTPATIENT)
Dept: ORTHOPEDIC SURGERY | Facility: CLINIC | Age: 50
End: 2020-04-22

## 2020-04-22 VITALS
HEIGHT: 72 IN | DIASTOLIC BLOOD PRESSURE: 83 MMHG | BODY MASS INDEX: 29.66 KG/M2 | HEART RATE: 76 BPM | SYSTOLIC BLOOD PRESSURE: 144 MMHG | TEMPERATURE: 97.6 F | WEIGHT: 219 LBS

## 2020-04-22 DIAGNOSIS — S83.241D TEAR OF MEDIAL MENISCUS OF RIGHT KNEE, CURRENT, UNSPECIFIED TEAR TYPE, SUBSEQUENT ENCOUNTER: Primary | ICD-10-CM

## 2020-04-22 PROCEDURE — 99214 OFFICE O/P EST MOD 30 MIN: CPT | Performed by: ORTHOPAEDIC SURGERY

## 2020-04-22 NOTE — PROGRESS NOTES
"     Patient ID: Vince Urbina is a 50 y.o. male.  Right knee pain  Vince is a 50-year-old gentleman here with longstanding right knee pain.  He is now had injections and physical therapy and been treated with activity modification with continued pain over the medial joint line with popping.  Pain is sharp and a 6/10.    Review of Systems:  Right knee pain  Denies chest pain      Objective:    /83   Pulse 76   Temp 97.6 °F (36.4 °C)   Ht 182.9 cm (72\")   Wt 99.3 kg (219 lb)   BMI 29.70 kg/m²     Physical Examination:   He is a pleasant male in no distress. He is alert and oriented x3 and appears his stated age.  Right knee demonstrates no effusion, mild medial joint line tenderness.  Range of motion 20 to 120 degrees with intact extensor mechanism and no varus or valgus instability.  Lachman, anterior, posterior drawer negative with negative Nataliia's.  Sensory and motor exam are intact all distributions. Dorsalis pedis and posterior tibialis pulses are palpable and capillary refill is less than two seconds to all digits         Imaging:       Assessment:    Right knee medial meniscus tear    Plan:  Treatment options discussed and he would like to proceed with right knee arthroscopy and partial medial meniscectomy  Risks and benefits, specifically risks of bleeding, scar, infection, stiffness, retear, nerve, tendon, artery damage, need for further surgery, DVT, and loss of life or limb were discussed. Questions, rehab, and restrictions were answered and addressed.          Disclaimer: Please note that areas of this note were completed with computer voice recognition software.  Quite often unanticipated grammatical, syntax, homophones, and other interpretive errors are inadvertently transcribed by the computer software. Please excuse any errors that have escaped final proofreading.  "

## 2020-04-23 ENCOUNTER — TELEPHONE (OUTPATIENT)
Dept: ORTHOPEDIC SURGERY | Facility: CLINIC | Age: 50
End: 2020-04-23

## 2020-04-23 NOTE — TELEPHONE ENCOUNTER
Patient wants to know if we can put an order in for a shower chair. States he has trouble standing in the showr.

## 2020-04-29 DIAGNOSIS — S83.241D TEAR OF MEDIAL MENISCUS OF RIGHT KNEE, CURRENT, UNSPECIFIED TEAR TYPE, SUBSEQUENT ENCOUNTER: Primary | ICD-10-CM

## 2020-04-29 DIAGNOSIS — M25.561 ACUTE PAIN OF RIGHT KNEE: ICD-10-CM

## 2020-04-29 DIAGNOSIS — M25.561 RIGHT KNEE PAIN, UNSPECIFIED CHRONICITY: ICD-10-CM

## 2020-04-29 DIAGNOSIS — M17.11 PRIMARY LOCALIZED OSTEOARTHROSIS OF RIGHT LOWER LEG: ICD-10-CM

## 2020-04-29 DIAGNOSIS — M23.51 CHRONIC INSTABILITY OF RIGHT KNEE: ICD-10-CM

## 2020-05-04 ENCOUNTER — TELEPHONE (OUTPATIENT)
Dept: PHYSICAL THERAPY | Facility: CLINIC | Age: 50
End: 2020-05-04

## 2020-05-19 PROBLEM — S83.241A TEAR OF MEDIAL MENISCUS OF RIGHT KNEE, CURRENT: Status: ACTIVE | Noted: 2020-05-19

## 2020-05-20 RX ORDER — MELOXICAM 15 MG/1
15 TABLET ORAL DAILY
COMMUNITY
End: 2020-06-24 | Stop reason: SDUPTHER

## 2020-05-26 ENCOUNTER — DOCUMENTATION (OUTPATIENT)
Dept: PHYSICAL THERAPY | Facility: CLINIC | Age: 50
End: 2020-05-26

## 2020-05-26 NOTE — PROGRESS NOTES
Discharge Summary  Discharge Summary from Physical Therapy Report      Dates  PT visit: 5  Number of Visits: 2/10/2020 to 3/17/2020     Discharge Status of Patient: discharged    Goals: Not Met    Discharge Plan: Continue with current home exercise program as instructed    Comments: The patient is being discharged today due to having surgery on his knee in the near future. The patient is being discharged at this moment.    Date of Discharge 5/26/2020        Mel Scott, PT  Physical Therapist

## 2020-05-27 ENCOUNTER — HOSPITAL ENCOUNTER (OUTPATIENT)
Dept: CARDIOLOGY | Facility: HOSPITAL | Age: 50
Discharge: HOME | End: 2020-05-27
Admitting: ORTHOPAEDIC SURGERY

## 2020-05-27 ENCOUNTER — LAB (OUTPATIENT)
Dept: LAB | Facility: HOSPITAL | Age: 50
End: 2020-05-27

## 2020-05-27 DIAGNOSIS — S83.241D TEAR OF MEDIAL MENISCUS OF RIGHT KNEE, CURRENT, UNSPECIFIED TEAR TYPE, SUBSEQUENT ENCOUNTER: ICD-10-CM

## 2020-05-27 LAB
ANION GAP SERPL CALCULATED.3IONS-SCNC: 10.3 MMOL/L (ref 5–15)
BASOPHILS # BLD AUTO: 0.06 10*3/MM3 (ref 0–0.2)
BASOPHILS NFR BLD AUTO: 0.7 % (ref 0–1.5)
BUN BLD-MCNC: 6 MG/DL (ref 6–20)
BUN/CREAT SERPL: 7.9 (ref 7–25)
CALCIUM SPEC-SCNC: 9.3 MG/DL (ref 8.6–10.5)
CHLORIDE SERPL-SCNC: 99 MMOL/L (ref 98–107)
CO2 SERPL-SCNC: 23.7 MMOL/L (ref 22–29)
CREAT BLD-MCNC: 0.76 MG/DL (ref 0.76–1.27)
DEPRECATED RDW RBC AUTO: 41.9 FL (ref 37–54)
EOSINOPHIL # BLD AUTO: 0.09 10*3/MM3 (ref 0–0.4)
EOSINOPHIL NFR BLD AUTO: 1.1 % (ref 0.3–6.2)
ERYTHROCYTE [DISTWIDTH] IN BLOOD BY AUTOMATED COUNT: 13.2 % (ref 12.3–15.4)
GFR SERPL CREATININE-BSD FRML MDRD: 109 ML/MIN/1.73
GLUCOSE BLD-MCNC: 344 MG/DL (ref 65–99)
HCT VFR BLD AUTO: 48 % (ref 37.5–51)
HGB BLD-MCNC: 16.5 G/DL (ref 13–17.7)
IMM GRANULOCYTES # BLD AUTO: 0.05 10*3/MM3 (ref 0–0.05)
IMM GRANULOCYTES NFR BLD AUTO: 0.6 % (ref 0–0.5)
LYMPHOCYTES # BLD AUTO: 2.64 10*3/MM3 (ref 0.7–3.1)
LYMPHOCYTES NFR BLD AUTO: 32 % (ref 19.6–45.3)
MCH RBC QN AUTO: 30.1 PG (ref 26.6–33)
MCHC RBC AUTO-ENTMCNC: 34.4 G/DL (ref 31.5–35.7)
MCV RBC AUTO: 87.6 FL (ref 79–97)
MONOCYTES # BLD AUTO: 0.57 10*3/MM3 (ref 0.1–0.9)
MONOCYTES NFR BLD AUTO: 6.9 % (ref 5–12)
NEUTROPHILS # BLD AUTO: 4.85 10*3/MM3 (ref 1.7–7)
NEUTROPHILS NFR BLD AUTO: 58.7 % (ref 42.7–76)
NRBC BLD AUTO-RTO: 0 /100 WBC (ref 0–0.2)
PLATELET # BLD AUTO: 236 10*3/MM3 (ref 140–450)
PMV BLD AUTO: 11.6 FL (ref 6–12)
POTASSIUM BLD-SCNC: 3.8 MMOL/L (ref 3.5–5.2)
RBC # BLD AUTO: 5.48 10*6/MM3 (ref 4.14–5.8)
SODIUM BLD-SCNC: 133 MMOL/L (ref 136–145)
WBC NRBC COR # BLD: 8.26 10*3/MM3 (ref 3.4–10.8)

## 2020-05-27 PROCEDURE — C9803 HOPD COVID-19 SPEC COLLECT: HCPCS

## 2020-05-27 PROCEDURE — 93005 ELECTROCARDIOGRAM TRACING: CPT | Performed by: ORTHOPAEDIC SURGERY

## 2020-05-27 PROCEDURE — 93010 ELECTROCARDIOGRAM REPORT: CPT | Performed by: INTERNAL MEDICINE

## 2020-05-27 PROCEDURE — 36415 COLL VENOUS BLD VENIPUNCTURE: CPT

## 2020-05-27 PROCEDURE — U0004 COV-19 TEST NON-CDC HGH THRU: HCPCS

## 2020-05-27 PROCEDURE — 80048 BASIC METABOLIC PNL TOTAL CA: CPT

## 2020-05-27 PROCEDURE — 85025 COMPLETE CBC W/AUTO DIFF WBC: CPT

## 2020-05-28 ENCOUNTER — ANESTHESIA EVENT (OUTPATIENT)
Dept: PERIOP | Facility: HOSPITAL | Age: 50
End: 2020-05-28

## 2020-05-28 DIAGNOSIS — S83.241D TEAR OF MEDIAL MENISCUS OF RIGHT KNEE, CURRENT, UNSPECIFIED TEAR TYPE, SUBSEQUENT ENCOUNTER: Primary | ICD-10-CM

## 2020-05-28 LAB
REF LAB TEST METHOD: NORMAL
SARS-COV-2 RNA RESP QL NAA+PROBE: NOT DETECTED

## 2020-05-28 RX ORDER — OXYCODONE HYDROCHLORIDE AND ACETAMINOPHEN 5; 325 MG/1; MG/1
1 TABLET ORAL EVERY 6 HOURS PRN
Qty: 20 TABLET | Refills: 0 | Status: SHIPPED | OUTPATIENT
Start: 2020-05-28 | End: 2020-06-24

## 2020-05-28 RX ORDER — CEPHALEXIN 500 MG/1
500 CAPSULE ORAL 4 TIMES DAILY
Qty: 4 CAPSULE | Refills: 0 | Status: ON HOLD | OUTPATIENT
Start: 2020-05-28 | End: 2020-05-29

## 2020-05-28 RX ORDER — NAPROXEN 500 MG/1
500 TABLET ORAL 2 TIMES DAILY WITH MEALS
Qty: 28 TABLET | Refills: 0 | Status: SHIPPED | OUTPATIENT
Start: 2020-05-28 | End: 2020-11-09

## 2020-05-29 ENCOUNTER — ANESTHESIA (OUTPATIENT)
Dept: PERIOP | Facility: HOSPITAL | Age: 50
End: 2020-05-29

## 2020-05-29 ENCOUNTER — HOSPITAL ENCOUNTER (OUTPATIENT)
Facility: HOSPITAL | Age: 50
Setting detail: HOSPITAL OUTPATIENT SURGERY
Discharge: HOME | End: 2020-05-29
Attending: ORTHOPAEDIC SURGERY | Admitting: ORTHOPAEDIC SURGERY

## 2020-05-29 VITALS
OXYGEN SATURATION: 98 % | DIASTOLIC BLOOD PRESSURE: 92 MMHG | HEIGHT: 72 IN | RESPIRATION RATE: 14 BRPM | TEMPERATURE: 97 F | HEART RATE: 78 BPM | WEIGHT: 220 LBS | SYSTOLIC BLOOD PRESSURE: 147 MMHG | BODY MASS INDEX: 29.8 KG/M2

## 2020-05-29 DIAGNOSIS — S83.241D TEAR OF MEDIAL MENISCUS OF RIGHT KNEE, CURRENT, UNSPECIFIED TEAR TYPE, SUBSEQUENT ENCOUNTER: ICD-10-CM

## 2020-05-29 LAB — GLUCOSE BLDC GLUCOMTR-MCNC: 282 MG/DL (ref 70–105)

## 2020-05-29 PROCEDURE — 25010000002 EPINEPHRINE PER 0.1 MG: Performed by: ORTHOPAEDIC SURGERY

## 2020-05-29 PROCEDURE — 25010000002 ONDANSETRON PER 1 MG: Performed by: ANESTHESIOLOGIST ASSISTANT

## 2020-05-29 PROCEDURE — 29880 ARTHRS KNE SRG MNISECTMY M&L: CPT | Performed by: ORTHOPAEDIC SURGERY

## 2020-05-29 PROCEDURE — 82962 GLUCOSE BLOOD TEST: CPT

## 2020-05-29 PROCEDURE — 25010000002 PROPOFOL 10 MG/ML EMULSION: Performed by: ANESTHESIOLOGIST ASSISTANT

## 2020-05-29 PROCEDURE — 63710000001 INSULIN LISPRO (HUMAN) PER 5 UNITS: Performed by: ANESTHESIOLOGY

## 2020-05-29 PROCEDURE — 25010000002 FENTANYL CITRATE (PF) 100 MCG/2ML SOLUTION: Performed by: ANESTHESIOLOGIST ASSISTANT

## 2020-05-29 PROCEDURE — 25010000002 DEXAMETHASONE PER 1 MG: Performed by: ANESTHESIOLOGIST ASSISTANT

## 2020-05-29 PROCEDURE — 25010000003 LIDOCAINE 1 % SOLUTION 50 ML VIAL: Performed by: ORTHOPAEDIC SURGERY

## 2020-05-29 PROCEDURE — 25010000002 MIDAZOLAM PER 1 MG: Performed by: ANESTHESIOLOGIST ASSISTANT

## 2020-05-29 RX ORDER — ONDANSETRON 2 MG/ML
4 INJECTION INTRAMUSCULAR; INTRAVENOUS ONCE AS NEEDED
Status: DISCONTINUED | OUTPATIENT
Start: 2020-05-29 | End: 2020-05-29 | Stop reason: HOSPADM

## 2020-05-29 RX ORDER — HYDRALAZINE HYDROCHLORIDE 20 MG/ML
5 INJECTION INTRAMUSCULAR; INTRAVENOUS
Status: DISCONTINUED | OUTPATIENT
Start: 2020-05-29 | End: 2020-05-29 | Stop reason: HOSPADM

## 2020-05-29 RX ORDER — LORAZEPAM 2 MG/ML
1 INJECTION INTRAMUSCULAR
Status: DISCONTINUED | OUTPATIENT
Start: 2020-05-29 | End: 2020-05-29 | Stop reason: HOSPADM

## 2020-05-29 RX ORDER — OXYCODONE HYDROCHLORIDE 5 MG/1
5 TABLET ORAL EVERY 6 HOURS PRN
Status: DISCONTINUED | OUTPATIENT
Start: 2020-05-29 | End: 2020-05-29 | Stop reason: HOSPADM

## 2020-05-29 RX ORDER — MEPERIDINE HYDROCHLORIDE 25 MG/ML
12.5 INJECTION INTRAMUSCULAR; INTRAVENOUS; SUBCUTANEOUS
Status: DISCONTINUED | OUTPATIENT
Start: 2020-05-29 | End: 2020-05-29 | Stop reason: HOSPADM

## 2020-05-29 RX ORDER — LABETALOL HYDROCHLORIDE 5 MG/ML
5 INJECTION, SOLUTION INTRAVENOUS
Status: DISCONTINUED | OUTPATIENT
Start: 2020-05-29 | End: 2020-05-29 | Stop reason: HOSPADM

## 2020-05-29 RX ORDER — IPRATROPIUM BROMIDE AND ALBUTEROL SULFATE 2.5; .5 MG/3ML; MG/3ML
3 SOLUTION RESPIRATORY (INHALATION) ONCE AS NEEDED
Status: DISCONTINUED | OUTPATIENT
Start: 2020-05-29 | End: 2020-05-29 | Stop reason: HOSPADM

## 2020-05-29 RX ORDER — PROPOFOL 10 MG/ML
VIAL (ML) INTRAVENOUS AS NEEDED
Status: DISCONTINUED | OUTPATIENT
Start: 2020-05-29 | End: 2020-05-29 | Stop reason: SURG

## 2020-05-29 RX ORDER — FENTANYL CITRATE 50 UG/ML
INJECTION, SOLUTION INTRAMUSCULAR; INTRAVENOUS AS NEEDED
Status: DISCONTINUED | OUTPATIENT
Start: 2020-05-29 | End: 2020-05-29 | Stop reason: SURG

## 2020-05-29 RX ORDER — SODIUM CHLORIDE, SODIUM LACTATE, POTASSIUM CHLORIDE, CALCIUM CHLORIDE 600; 310; 30; 20 MG/100ML; MG/100ML; MG/100ML; MG/100ML
9 INJECTION, SOLUTION INTRAVENOUS CONTINUOUS PRN
Status: DISCONTINUED | OUTPATIENT
Start: 2020-05-29 | End: 2020-05-29 | Stop reason: HOSPADM

## 2020-05-29 RX ORDER — DIPHENHYDRAMINE HYDROCHLORIDE 50 MG/ML
12.5 INJECTION INTRAMUSCULAR; INTRAVENOUS
Status: DISCONTINUED | OUTPATIENT
Start: 2020-05-29 | End: 2020-05-29 | Stop reason: HOSPADM

## 2020-05-29 RX ORDER — NALOXONE HCL 0.4 MG/ML
0.4 VIAL (ML) INJECTION AS NEEDED
Status: DISCONTINUED | OUTPATIENT
Start: 2020-05-29 | End: 2020-05-29 | Stop reason: HOSPADM

## 2020-05-29 RX ORDER — ONDANSETRON 2 MG/ML
INJECTION INTRAMUSCULAR; INTRAVENOUS AS NEEDED
Status: DISCONTINUED | OUTPATIENT
Start: 2020-05-29 | End: 2020-05-29 | Stop reason: SURG

## 2020-05-29 RX ORDER — SODIUM CHLORIDE 0.9 % (FLUSH) 0.9 %
10 SYRINGE (ML) INJECTION EVERY 12 HOURS SCHEDULED
Status: DISCONTINUED | OUTPATIENT
Start: 2020-05-29 | End: 2020-05-29 | Stop reason: HOSPADM

## 2020-05-29 RX ORDER — FLUMAZENIL 0.1 MG/ML
0.1 INJECTION INTRAVENOUS AS NEEDED
Status: DISCONTINUED | OUTPATIENT
Start: 2020-05-29 | End: 2020-05-29 | Stop reason: HOSPADM

## 2020-05-29 RX ORDER — MORPHINE SULFATE 4 MG/ML
2 INJECTION, SOLUTION INTRAMUSCULAR; INTRAVENOUS
Status: DISCONTINUED | OUTPATIENT
Start: 2020-05-29 | End: 2020-05-29

## 2020-05-29 RX ORDER — SODIUM CHLORIDE 0.9 % (FLUSH) 0.9 %
10 SYRINGE (ML) INJECTION AS NEEDED
Status: DISCONTINUED | OUTPATIENT
Start: 2020-05-29 | End: 2020-05-29 | Stop reason: HOSPADM

## 2020-05-29 RX ORDER — EPINEPHRINE 1 MG/ML
INJECTION, SOLUTION, CONCENTRATE INTRAVENOUS AS NEEDED
Status: DISCONTINUED | OUTPATIENT
Start: 2020-05-29 | End: 2020-05-29 | Stop reason: HOSPADM

## 2020-05-29 RX ORDER — LIDOCAINE HYDROCHLORIDE 10 MG/ML
INJECTION, SOLUTION EPIDURAL; INFILTRATION; INTRACAUDAL; PERINEURAL AS NEEDED
Status: DISCONTINUED | OUTPATIENT
Start: 2020-05-29 | End: 2020-05-29 | Stop reason: SURG

## 2020-05-29 RX ORDER — DEXAMETHASONE SODIUM PHOSPHATE 4 MG/ML
INJECTION, SOLUTION INTRA-ARTICULAR; INTRALESIONAL; INTRAMUSCULAR; INTRAVENOUS; SOFT TISSUE AS NEEDED
Status: DISCONTINUED | OUTPATIENT
Start: 2020-05-29 | End: 2020-05-29 | Stop reason: SURG

## 2020-05-29 RX ORDER — MIDAZOLAM HYDROCHLORIDE 1 MG/ML
INJECTION INTRAMUSCULAR; INTRAVENOUS AS NEEDED
Status: DISCONTINUED | OUTPATIENT
Start: 2020-05-29 | End: 2020-05-29 | Stop reason: SURG

## 2020-05-29 RX ADMIN — ONDANSETRON 4 MG: 2 INJECTION INTRAMUSCULAR; INTRAVENOUS at 13:06

## 2020-05-29 RX ADMIN — INSULIN LISPRO 4 UNITS: 100 INJECTION, SOLUTION INTRAVENOUS; SUBCUTANEOUS at 10:32

## 2020-05-29 RX ADMIN — FENTANYL CITRATE 50 MCG: 50 INJECTION, SOLUTION INTRAMUSCULAR; INTRAVENOUS at 12:52

## 2020-05-29 RX ADMIN — LIDOCAINE HYDROCHLORIDE 50 MG: 10 INJECTION, SOLUTION EPIDURAL; INFILTRATION; INTRACAUDAL; PERINEURAL at 12:57

## 2020-05-29 RX ADMIN — PROPOFOL 200 MG: 10 INJECTION, EMULSION INTRAVENOUS at 12:57

## 2020-05-29 RX ADMIN — SODIUM CHLORIDE, SODIUM LACTATE, POTASSIUM CHLORIDE, AND CALCIUM CHLORIDE 9 ML/HR: 600; 310; 30; 20 INJECTION, SOLUTION INTRAVENOUS at 10:32

## 2020-05-29 RX ADMIN — DEXAMETHASONE SODIUM PHOSPHATE 4 MG: 4 INJECTION, SOLUTION INTRAMUSCULAR; INTRAVENOUS at 13:06

## 2020-05-29 RX ADMIN — FENTANYL CITRATE 50 MCG: 50 INJECTION, SOLUTION INTRAMUSCULAR; INTRAVENOUS at 12:57

## 2020-05-29 RX ADMIN — CEFAZOLIN SODIUM 2 G: 1 INJECTION, POWDER, FOR SOLUTION INTRAMUSCULAR; INTRAVENOUS at 13:00

## 2020-05-29 RX ADMIN — MIDAZOLAM 2 MG: 1 INJECTION INTRAMUSCULAR; INTRAVENOUS at 12:52

## 2020-05-29 NOTE — ANESTHESIA PROCEDURE NOTES
Airway  Urgency: elective    Date/Time: 5/29/2020 12:58 PM  Airway not difficult    General Information and Staff    Patient location during procedure: OR    Indications and Patient Condition  Indications for airway management: airway protection    Preoxygenated: yes  Mask difficulty assessment: 0 - not attempted    Final Airway Details  Final airway type: supraglottic airway      Successful airway: LMA  Size 4    Number of attempts at approach: 1  Assessment: lips, teeth, and gum same as pre-op and atraumatic intubation

## 2020-05-29 NOTE — ANESTHESIA POSTPROCEDURE EVALUATION
Patient: Vince Urbina    Procedure Summary     Date:  05/29/20 Room / Location:  [facility] OR [address] [place] [address] MAIN OR    Anesthesia Start:  1250 Anesthesia Stop:  1325    Procedure:  KNEE ARTHROSCOPY with partial lateral and medial  meniscectomy (Right Knee) Diagnosis:       Tear of medial meniscus of right knee, current, unspecified tear type, subsequent encounter      (Tear of medial meniscus of right knee, current, unspecified tear type, subsequent encounter [S83.633J])    Surgeon:  Liu Garcia MD Provider:  Thuan Briggs MD    Anesthesia Type:  general ASA Status:  3          Anesthesia Type: general    Vitals  Vitals Value Taken Time   /89 5/29/2020  2:21 PM   Temp 97 °F (36.1 °C) 5/29/2020  2:21 PM   Pulse 78 5/29/2020  2:21 PM   Resp 12 5/29/2020  2:21 PM   SpO2 97 % 5/29/2020  2:21 PM           Post Anesthesia Care and Evaluation    Pain management: adequate  Airway patency: patent  Anesthetic complications: No anesthetic complications  PONV Status: none  Cardiovascular status: acceptable  Respiratory status: acceptable  Hydration status: acceptable

## 2020-05-29 NOTE — ANESTHESIA PREPROCEDURE EVALUATION
Anesthesia Evaluation     Patient summary reviewed and Nursing notes reviewed   history of anesthetic complications:  NPO Solid Status: > 8 hours  NPO Liquid Status: > 8 hours           Airway   Mallampati: II  TM distance: >3 FB  Neck ROM: full  No difficulty expected  Dental    (+) edentulous    Pulmonary    (+) COPD, asthma,  (-) shortness of breath  Cardiovascular   Exercise tolerance: good (4-7 METS)    (+) hypertension, past MI  >12 months,   (-) angina      Neuro/Psych  (+) psychiatric history Anxiety, Depression and PTSD,     GI/Hepatic/Renal/Endo      Musculoskeletal     Abdominal    Substance History      OB/GYN          Other   arthritis,      ROS/Med Hx Other: Awareness?                  Anesthesia Plan    ASA 3     general     intravenous induction     Anesthetic plan, all risks, benefits, and alternatives have been provided, discussed and informed consent has been obtained with: patient.

## 2020-06-01 ENCOUNTER — OFFICE VISIT (OUTPATIENT)
Dept: ORTHOPEDIC SURGERY | Facility: CLINIC | Age: 50
End: 2020-06-01

## 2020-06-01 VITALS
HEIGHT: 72 IN | DIASTOLIC BLOOD PRESSURE: 94 MMHG | BODY MASS INDEX: 29.8 KG/M2 | SYSTOLIC BLOOD PRESSURE: 130 MMHG | HEART RATE: 90 BPM | WEIGHT: 220 LBS

## 2020-06-01 DIAGNOSIS — Z47.89 ORTHOPEDIC AFTERCARE: Primary | ICD-10-CM

## 2020-06-01 PROCEDURE — 99024 POSTOP FOLLOW-UP VISIT: CPT | Performed by: ORTHOPAEDIC SURGERY

## 2020-06-01 NOTE — PATIENT INSTRUCTIONS
Knee Arthroscopy: Post- Operative Visit Objectives    1) Review the operative findings, procedures and photos.  2) Make sure medications are effective and not causing problems.  a) Anti-inflammatory-Naproxen 500mg twice a day for two weeks.  If you have stomach upset a gentler over the counter medication such as Aleve, Ibuprofen, Advil or Motrin can be used.  If you have any problems, allergies, or severe stomach intolerance stop taking these medicines and please tell us.   b) Pain: Hydrocodone or oxycodone is for pain. This is an excellent pain reliever that is a narcotic plus Tylenol. You may take 1 tablet every 6 hours as necessary.  Many patients don’t require the use of this if pain is mild…in those circumstances just use Tylenol extra-strength, 1 or 2 tablets every 6 hours  c) Antibiotic: You will receive a prescription for 24 hours of an antibiotic, please finish this whole bottle.   3) Wound Care:  a) Today we will change your dressings. If it is appropriate we will cover your wounds with a plastic covering called Tegaderm. This will allow you to shower immediately. No baths or swimming until the bandages are removed.         You can peel the Tegaderm and Steri-Strips off in 2 weeks at home then shower without a dressing         The ace bandage remains on for 2 weeks as well then as needed  4) Exercises and Physical Therapy   a) Continue the basic exercises 4x/day  b) Today you can begin to ride a stationary bike.  Start at 10 minutes with no resistance and slowly increase the time (by 1-2 minute increments).  Once you have reached 30 minutes you may add some mild resistance every few days.  c) Ultimate goal is to ride continuously for 1 hour per day, 5 days per week with moderate resistance.  d) Most of the time formal therapy is not required, but if so it will be ordered today  5) Cane or Crutches  a) Make sure that you are staying on your crutches or cane for 7 days.   b) Remember in the 1st 4 weeks make  a point not to “over-walk” especially if you have some arthritis…this will cause more pain and swelling!  6) Follow Up appointments  a) Schedule Follow up visit before you leave the office today for approximately 4 weeks.  7) Notes etc:  a) Make sure you have all necessary notes and documentation for school or work.  8) Issues: Remember our goal is to make this process smooth and easy if there is any thing you need please ask us or call [phone]  9)

## 2020-06-01 NOTE — PROGRESS NOTES
"     Patient ID: Vince Urbina is a 50 y.o. male.    5/29/30 right knee arthroscopy with partial medial lateral meniscectomy  Pain is controlled    Objective:    /94   Pulse 90   Ht 182.9 cm (72\")   Wt 99.8 kg (220 lb)   BMI 29.84 kg/m²     Physical Examination:    Wounds are well approximated without infection.  Trace effusion, Christopher negative. Sensory and motor exam are intact all distributions. Dorsalis pedis and posterior tibialis pulses are palpable and capillary refill is less than two seconds to all digits    Imaging:      Assessment:  Doing well after knee arthroscopy    Plan:  Wounds were cleaned and redressed. Restrictions discussed. Begin home exercises. See me in 4 weeks. Remove dressing in 2 weeks  "

## 2020-06-11 ENCOUNTER — TELEPHONE (OUTPATIENT)
Dept: ORTHOPEDIC SURGERY | Facility: CLINIC | Age: 50
End: 2020-06-11

## 2020-06-11 NOTE — TELEPHONE ENCOUNTER
Patient called and states that he is has two swollen areas around the incisions. He states that it does not seem infected and he is not having increased pain. I told him to continue to monitor, ice, and elevate. Let him know to call at the beginning of next week if he feels like he should be seen. Also let him know to go to ER or urgent care if he starts to have symptoms of infection.

## 2020-06-18 ENCOUNTER — TELEPHONE (OUTPATIENT)
Dept: ORTHOPEDIC SURGERY | Facility: CLINIC | Age: 50
End: 2020-06-18

## 2020-06-18 NOTE — TELEPHONE ENCOUNTER
Patient called today and wants to know if we can prescribe him and anti inflammatory. He is out of his medication from surgery and is still having some pain. He has taken Mobic before, but no longer has a script for it.

## 2020-06-22 RX ORDER — MELOXICAM 15 MG/1
15 TABLET ORAL DAILY PRN
Qty: 30 TABLET | Refills: 4 | Status: SHIPPED | OUTPATIENT
Start: 2020-06-22 | End: 2020-11-09

## 2020-06-24 ENCOUNTER — OFFICE VISIT (OUTPATIENT)
Dept: ORTHOPEDIC SURGERY | Facility: CLINIC | Age: 50
End: 2020-06-24

## 2020-06-24 VITALS
WEIGHT: 206.8 LBS | SYSTOLIC BLOOD PRESSURE: 140 MMHG | BODY MASS INDEX: 28.01 KG/M2 | HEART RATE: 89 BPM | DIASTOLIC BLOOD PRESSURE: 88 MMHG | HEIGHT: 72 IN

## 2020-06-24 DIAGNOSIS — Z47.89 ORTHOPEDIC AFTERCARE: Primary | ICD-10-CM

## 2020-06-24 PROCEDURE — 99024 POSTOP FOLLOW-UP VISIT: CPT | Performed by: ORTHOPAEDIC SURGERY

## 2020-06-24 RX ORDER — LORATADINE 10 MG
TABLET ORAL
COMMUNITY
Start: 2020-06-23

## 2020-06-24 RX ORDER — ATORVASTATIN CALCIUM 40 MG/1
40 TABLET, FILM COATED ORAL
COMMUNITY
Start: 2020-06-22

## 2020-06-24 NOTE — PROGRESS NOTES
"     Patient ID: Vince Urbnia is a 50 y.o. male.    5/29/30 right knee arthroscopy with partial medial lateral meniscectomy  Pain is mild    Objective:    /88   Pulse 89   Ht 182.9 cm (72\")   Wt 93.8 kg (206 lb 12.8 oz)   BMI 28.05 kg/m²     Physical Examination:    Incisions are healed, no effusion or infection.  Range of motion 0.35 degrees with a negative Christopher    Imaging:      Assessment:  Pain after knee arthroscopy    Plan:  Recommend a brace and physical therapy as well as low impact exercise, see me in 6 weeks  "

## 2020-06-25 DIAGNOSIS — Z47.89 ORTHOPEDIC AFTERCARE: ICD-10-CM

## 2020-06-25 DIAGNOSIS — M17.0 BILATERAL PRIMARY OSTEOARTHRITIS OF KNEE: Primary | ICD-10-CM

## 2020-11-09 ENCOUNTER — OFFICE VISIT (OUTPATIENT)
Dept: ORTHOPEDIC SURGERY | Facility: CLINIC | Age: 50
End: 2020-11-09

## 2020-11-09 VITALS
DIASTOLIC BLOOD PRESSURE: 85 MMHG | HEIGHT: 72 IN | SYSTOLIC BLOOD PRESSURE: 128 MMHG | HEART RATE: 63 BPM | BODY MASS INDEX: 27.9 KG/M2 | WEIGHT: 206 LBS

## 2020-11-09 DIAGNOSIS — M25.561 ACUTE PAIN OF RIGHT KNEE: ICD-10-CM

## 2020-11-09 DIAGNOSIS — M25.562 ACUTE PAIN OF LEFT KNEE: Primary | ICD-10-CM

## 2020-11-09 PROCEDURE — 99213 OFFICE O/P EST LOW 20 MIN: CPT | Performed by: ORTHOPAEDIC SURGERY

## 2020-11-09 NOTE — PATIENT INSTRUCTIONS
MRI follow-up instructions    Today at your office visit, Dr. Garcia recommended an MRI (magnetic resonance imaging) to evaluate your joint pain.  This requires a precertification process, which our office will do, and then we will contact you when it is approved and go over scheduling options.  We typically recommend these to be performed at [facility] or [facility].  If for some reason it is performed elsewhere please arrange to have that facility give you a disc with your images on it so Dr. Garcia can review it at your follow-up visit.    When checking out today we recommend making an appointment to go over your results in approximately two weeks.  If your MRI is done sooner than that we would be happy to schedule you sooner to go over your results, just contact us at [phone] or through the [employer] portal to let us know your MRI is completed.  Seeing you in person for the results gives us the best opportunity to look at your images together and explain the diagnosis and treatment options to best help you.

## 2020-11-09 NOTE — PROGRESS NOTES
Patient ID: Vince Urbina is a 50 y.o. male.  Bilateral knee pain  5/29/20 right knee arthroscopy with partial medial lateral meniscectomy and grade 3 4 changes in the lateral compartment  Pain is returning to both knees on the right worse than the left.  No mechanical complaint but they do swell and pop at times    Review of Systems:  Bilateral knee pain  Denies chest pain      Objective:    There were no vitals taken for this visit.    Physical Examination:   She is a pleasant female in no distress. She is alert and oriented x3 and appears her stated age.  Right knee demonstrates healed portals.  Both knees demonstrate mild effusions with moderate medial and lateral joint line tenderness.  Range of motion 0-120 bilateral.  No instability bilateral.  Has pain on Nataliia medial and lateral on both knees.Sensory and motor exam are intact all distributions. Dorsalis pedis and posterior tibialis pulses are palpable and capillary refill is less than two seconds to all digits      Imaging:       Assessment:    Bilateral knee pain with history of arthroscopy on the right    Plan:  Treatment options involving repeat MRI versus viscosupplementation were discussed.  He would like to proceed with repeat imaging.  See me after MRI          Disclaimer: Please note that areas of this note were completed with computer voice recognition software.  Quite often unanticipated grammatical, syntax, homophones, and other interpretive errors are inadvertently transcribed by the computer software. Please excuse any errors that have escaped final proofreading.

## 2020-11-16 ENCOUNTER — HOSPITAL ENCOUNTER (OUTPATIENT)
Dept: MRI IMAGING | Facility: HOSPITAL | Age: 50
Discharge: HOME | End: 2020-11-16

## 2020-11-16 DIAGNOSIS — M25.561 ACUTE PAIN OF RIGHT KNEE: ICD-10-CM

## 2020-11-16 DIAGNOSIS — M25.562 ACUTE PAIN OF LEFT KNEE: ICD-10-CM

## 2020-11-16 PROCEDURE — 73721 MRI JNT OF LWR EXTRE W/O DYE: CPT

## 2020-11-23 ENCOUNTER — OFFICE VISIT (OUTPATIENT)
Dept: ORTHOPEDIC SURGERY | Facility: CLINIC | Age: 50
End: 2020-11-23

## 2020-11-23 VITALS
HEIGHT: 72 IN | SYSTOLIC BLOOD PRESSURE: 120 MMHG | WEIGHT: 206 LBS | HEART RATE: 71 BPM | DIASTOLIC BLOOD PRESSURE: 83 MMHG | BODY MASS INDEX: 27.9 KG/M2

## 2020-11-23 DIAGNOSIS — M17.0 BILATERAL PRIMARY OSTEOARTHRITIS OF KNEE: Primary | ICD-10-CM

## 2020-11-23 PROCEDURE — 99213 OFFICE O/P EST LOW 20 MIN: CPT | Performed by: ORTHOPAEDIC SURGERY

## 2020-11-23 NOTE — PROGRESS NOTES
Patient ID: Vince Urbina is a 50 y.o. male.  Bilateral knee pain  5/29/20 right knee arthroscopy with partial medial lateral meniscectomy and grade 3 4 changes in the lateral compartment  Pain is returning to both knees on the right worse than the left.  No mechanical complaint but they do swell and pop at times    Review of Systems:    Bilateral knee pain  Denies chest pain    Objective:    There were no vitals taken for this visit.    Physical Examination:   He is a pleasant male in no distress. he is alert and oriented x3 and appears his stated age.  Right knee demonstrates healed portals.  Both knees demonstrate mild effusions with moderate medial and lateral joint line tenderness.  Range of motion 0-120 bilateral.  No instability bilateral.  Has pain on Nataliia medial and lateral on both knees.Sensory and motor exam are intact all distributions. Dorsalis pedis and posterior tibialis pulses are palpable and capillary refill is less than two seconds to all digits      Imaging:   MRI demonstrates mild to moderate chondromalacia patella both knees with postop changes with right knee but no new tear    Assessment:    Bilateral knee degenerative disease    Plan:  I recommend viscosupplementation          Disclaimer: Please note that areas of this note were completed with computer voice recognition software.  Quite often unanticipated grammatical, syntax, homophones, and other interpretive errors are inadvertently transcribed by the computer software. Please excuse any errors that have escaped final proofreading.

## 2020-11-24 ENCOUNTER — TELEPHONE (OUTPATIENT)
Dept: ORTHOPEDIC SURGERY | Facility: CLINIC | Age: 50
End: 2020-11-24

## 2020-12-04 ENCOUNTER — TELEPHONE (OUTPATIENT)
Dept: ORTHOPEDIC SURGERY | Facility: CLINIC | Age: 50
End: 2020-12-04

## 2020-12-04 NOTE — TELEPHONE ENCOUNTER
Caller: JAYE NIEVES    Relationship to patient: SELF     Best call back number: [phone]    Additional notes:PATIENT CALLING TO CHECK ON AUTHS FOR MONOVISC INJECTIONS,

## 2020-12-09 ENCOUNTER — TELEPHONE (OUTPATIENT)
Dept: ORTHOPEDIC SURGERY | Facility: CLINIC | Age: 50
End: 2020-12-09

## 2020-12-09 NOTE — TELEPHONE ENCOUNTER
Called patient. Went over SOB. He wanted to proceed with injection. He was scheduled for injection.

## 2020-12-09 NOTE — TELEPHONE ENCOUNTER
PT CALLING BACK AGAIN TO CHECK ON AUTH FOR MONOVISC INJECTION FOR PT'S KNEES. WANTING TO SEE DR. SINGH, ASAP. PAIN BOTH IN BOTH KNEES.    [facility] - [phone]

## 2020-12-21 ENCOUNTER — OFFICE VISIT (OUTPATIENT)
Dept: ORTHOPEDIC SURGERY | Facility: CLINIC | Age: 50
End: 2020-12-21

## 2020-12-21 VITALS
BODY MASS INDEX: 27.9 KG/M2 | HEIGHT: 72 IN | DIASTOLIC BLOOD PRESSURE: 85 MMHG | HEART RATE: 61 BPM | SYSTOLIC BLOOD PRESSURE: 121 MMHG | WEIGHT: 206 LBS

## 2020-12-21 DIAGNOSIS — M17.0 BILATERAL PRIMARY OSTEOARTHRITIS OF KNEE: Primary | ICD-10-CM

## 2020-12-21 PROCEDURE — 20610 DRAIN/INJ JOINT/BURSA W/O US: CPT | Performed by: ORTHOPAEDIC SURGERY

## 2020-12-21 NOTE — PROGRESS NOTES
"     Patient ID: Vince Urbina is a 50 y.o. male.    Bilateral knee pain  Vince is here for viscosupplementation bilateral.  He did have his right knee hit by a cart over the weekend    Objective:    /85   Pulse 61   Ht 182.9 cm (72\")   Wt 93.4 kg (206 lb)   BMI 27.94 kg/m²     Physical Examination:    Both knees demonstrate no redness no effusion.  The right knee demonstrates a bruise above the femoral condyle medially with some tenderness.  Knee range of motion is 5 to 120 degrees with no varus valgus laxity.  Lachman, anterior, posterior drawer negative    Imaging:      Assessment:  Bilateral knee degenerative joint disease    Plan:  I recommend a brace for the right knee and proceeding with viscosupplementation      Large Joint Arthrocentesis: R knee  Date/Time: 12/21/2020 1:50 PM  Consent given by: patient  Timeout: Immediately prior to procedure a time out was called to verify the correct patient, procedure, equipment, support staff and site/side marked as required   Supporting Documentation  Indications: pain   Procedure Details  Location: knee - R knee  Preparation: Patient was prepped and draped in the usual sterile fashion  Needle size: 22 G  Medications administered: 88 mg Hyaluronan 88 MG/4ML  Patient tolerance: patient tolerated the procedure well with no immediate complications    Large Joint Arthrocentesis: L knee  Date/Time: 12/21/2020 1:50 PM  Consent given by: patient  Timeout: Immediately prior to procedure a time out was called to verify the correct patient, procedure, equipment, support staff and site/side marked as required   Supporting Documentation  Indications: pain   Procedure Details  Location: knee - L knee  Preparation: Patient was prepped and draped in the usual sterile fashion  Needle size: 22 G  Medications administered: 88 mg Hyaluronan 88 MG/4ML  Patient tolerance: patient tolerated the procedure well with no immediate complications        "

## 2021-02-10 ENCOUNTER — OFFICE VISIT (OUTPATIENT)
Dept: ORTHOPEDIC SURGERY | Facility: CLINIC | Age: 51
End: 2021-02-10

## 2021-02-10 VITALS
SYSTOLIC BLOOD PRESSURE: 143 MMHG | HEART RATE: 90 BPM | BODY MASS INDEX: 27.9 KG/M2 | HEIGHT: 72 IN | DIASTOLIC BLOOD PRESSURE: 97 MMHG | WEIGHT: 206 LBS

## 2021-02-10 DIAGNOSIS — S83.241D OTHER TEAR OF MEDIAL MENISCUS OF RIGHT KNEE AS CURRENT INJURY, SUBSEQUENT ENCOUNTER: ICD-10-CM

## 2021-02-10 DIAGNOSIS — M25.561 RIGHT KNEE PAIN, UNSPECIFIED CHRONICITY: Primary | ICD-10-CM

## 2021-02-10 LAB
APPEARANCE FLD: ABNORMAL
COLOR FLD: YELLOW
CRYSTALS FLD MICRO: NORMAL
LYMPHOCYTES NFR FLD MANUAL: 1 %
METHOD: ABNORMAL
MONOCYTES NFR FLD: 12 %
NEUTROPHILS NFR FLD MANUAL: 87 %
NUC CELL # FLD: ABNORMAL /MM3

## 2021-02-10 PROCEDURE — 87070 CULTURE OTHR SPECIMN AEROBIC: CPT | Performed by: ORTHOPAEDIC SURGERY

## 2021-02-10 PROCEDURE — 89060 EXAM SYNOVIAL FLUID CRYSTALS: CPT | Performed by: ORTHOPAEDIC SURGERY

## 2021-02-10 PROCEDURE — 87205 SMEAR GRAM STAIN: CPT | Performed by: ORTHOPAEDIC SURGERY

## 2021-02-10 PROCEDURE — 20610 DRAIN/INJ JOINT/BURSA W/O US: CPT | Performed by: ORTHOPAEDIC SURGERY

## 2021-02-10 PROCEDURE — 99213 OFFICE O/P EST LOW 20 MIN: CPT | Performed by: ORTHOPAEDIC SURGERY

## 2021-02-10 PROCEDURE — 89051 BODY FLUID CELL COUNT: CPT | Performed by: ORTHOPAEDIC SURGERY

## 2021-02-10 NOTE — PROGRESS NOTES
"     Patient ID: Vince Urbina is a 50 y.o. male.  Right knee pain  Vince is a 50-year-old gentleman here with longstanding right knee pain.  He did well after viscosupplementation in December but has had about 4 days of increasing right knee pain and swelling.  Denies injury.  Denies fevers    Review of Systems:  Right knee pain      Objective:    /97   Pulse 90   Ht 182.9 cm (72\")   Wt 93.4 kg (206 lb)   BMI 27.94 kg/m²     Physical Examination:     Right knee demonstrates healed previous portals.  There is a moderate to large effusion.  No redness or warmth.  Knee range of motion is painful from 10 to 100 degrees.  Christopher negative    Imaging:       Assessment:    Right knee effusion    Plan:   I recommend aspiration, under sterile technique and after verbal consent I aspirated 80 cc of benign-appearing clear yellow joint fluid.  He tolerated it well.  We will send for analysis.  Recommend icing anti-inflammatories and see me in 2 weeks  Will contact if fluid has any worrisome signs such as infection      Procedures          Disclaimer: Please note that areas of this note were completed with computer voice recognition software.  Quite often unanticipated grammatical, syntax, homophones, and other interpretive errors are inadvertently transcribed by the computer software. Please excuse any errors that have escaped final proofreading.  "

## 2021-02-11 ENCOUNTER — PREP FOR SURGERY (OUTPATIENT)
Dept: OTHER | Facility: HOSPITAL | Age: 51
End: 2021-02-11

## 2021-02-11 ENCOUNTER — TELEPHONE (OUTPATIENT)
Dept: ORTHOPEDIC SURGERY | Facility: CLINIC | Age: 51
End: 2021-02-11

## 2021-02-11 DIAGNOSIS — M25.561 RIGHT KNEE PAIN, UNSPECIFIED CHRONICITY: Primary | ICD-10-CM

## 2021-02-11 NOTE — TELEPHONE ENCOUNTER
Provider: MIKE SINGH  Caller: JAYE NIEVES  Relationship to Patient: SELF    Phone Number: [phone]  Reason for Call: PATIENT RETURNING CALL TO DR SINGH REGARDING TEST RESULTS

## 2021-02-15 LAB
BACTERIA FLD CULT: NORMAL
GRAM STN SPEC: NORMAL
GRAM STN SPEC: NORMAL

## 2022-07-12 ENCOUNTER — OFFICE VISIT (OUTPATIENT)
Dept: FAMILY MEDICINE CLINIC | Facility: CLINIC | Age: 52
End: 2022-07-12

## 2022-07-12 VITALS
SYSTOLIC BLOOD PRESSURE: 100 MMHG | TEMPERATURE: 97.1 F | HEART RATE: 89 BPM | DIASTOLIC BLOOD PRESSURE: 60 MMHG | OXYGEN SATURATION: 99 % | HEIGHT: 72 IN | BODY MASS INDEX: 25.98 KG/M2 | WEIGHT: 191.8 LBS

## 2022-07-12 DIAGNOSIS — Z91.018 MULTIPLE FOOD ALLERGIES: ICD-10-CM

## 2022-07-12 DIAGNOSIS — I10 ESSENTIAL HYPERTENSION: ICD-10-CM

## 2022-07-12 DIAGNOSIS — F41.9 ANXIETY: ICD-10-CM

## 2022-07-12 DIAGNOSIS — F43.10 PTSD (POST-TRAUMATIC STRESS DISORDER): ICD-10-CM

## 2022-07-12 DIAGNOSIS — J44.9 CHRONIC OBSTRUCTIVE PULMONARY DISEASE, UNSPECIFIED COPD TYPE: Primary | ICD-10-CM

## 2022-07-12 PROCEDURE — 71046 X-RAY EXAM CHEST 2 VIEWS: CPT | Performed by: NURSE PRACTITIONER

## 2022-07-12 PROCEDURE — 99204 OFFICE O/P NEW MOD 45 MIN: CPT | Performed by: NURSE PRACTITIONER

## 2022-07-12 NOTE — PROGRESS NOTES
"Chief Complaint  Establish Care and Med Refill (Epi pen)    Subjective        Vince Urbina presents to [facility]  Patient presents to the office today to establish care. Patient presents with multiple chronic conditions.  Patient has PTSD and needs to begin therapy. He has HTN. Patient blood pressure is 100/60. Patient has COPD and is stable with albuterol and breo. Patient reports multiple allergies is unsure of food groups he cannot have. He has been told mustard and blueberries. Patient has never used an epi pen.         Objective   Vital Signs:  /60 (BP Location: Left arm, Patient Position: Sitting, Cuff Size: Adult)   Pulse 89   Temp 97.1 °F (36.2 °C) (Infrared)   Ht 182.9 cm (72.01\")   Wt 87 kg (191 lb 12.8 oz)   SpO2 99%   BMI 26.01 kg/m²   Estimated body mass index is 26.01 kg/m² as calculated from the following:    Height as of this encounter: 182.9 cm (72.01\").    Weight as of this encounter: 87 kg (191 lb 12.8 oz).    BMI is >= 25 and <30. (Overweight) The following options were offered after discussion;: exercise counseling/recommendations and nutrition counseling/recommendations      Physical Exam  Constitutional:       General: He is not in acute distress.     Appearance: Normal appearance.   Eyes:      Pupils: Pupils are equal, round, and reactive to light.   Cardiovascular:      Rate and Rhythm: Normal rate and regular rhythm.      Pulses: Normal pulses.      Heart sounds: Normal heart sounds.   Pulmonary:      Effort: Pulmonary effort is normal.      Breath sounds: Normal breath sounds. No wheezing or rales.   Neurological:      Mental Status: He is alert.   Psychiatric:         Mood and Affect: Mood normal.         Behavior: Behavior normal.        Result Review :  The following data was reviewed by: LISET Hood on 07/12/2022:  Common labs    Common Labsle 1/15/22   WBC 6.97   Hemoglobin 17.0   Hematocrit 48.5   Platelets 175                  "   Assessment and Plan   Diagnoses and all orders for this visit:    1. Chronic obstructive pulmonary disease, unspecified COPD type (HCC) (Primary)  Assessment & Plan:  COPD  Patient taking inhalers as needed;   Pt will benefit from a home nebulizer  Side effects of all new and old medications reviewed with the patient -willing to accept all risks involved.  Advised to rto if no improvement or worsening of symptoms.  Patient instructed to  clinical summary at .     Radiology to read final chest xray report        Orders:  -     Breo Ellipta 100-25 MCG/INH inhaler; Inhale 1 puff Daily.  Dispense: 1 each; Refill: 3  -     XR Chest PA & Lateral (In Office)  -     albuterol (PROVENTIL) (5 MG/ML) 0.5% nebulizer solution; Take 0.5 mL by nebulization Every 6 (Six) Hours As Needed for Wheezing.  Dispense: 0.5 mL; Refill: 5    2. Essential hypertension  Assessment & Plan:  No signs of hypertension; blood pressure normal  Pt is not on medication   Continue diet and exercise      3. Anxiety  Assessment & Plan:  Pt to fu with [employer]        4. PTSD (post-traumatic stress disorder)  Assessment & Plan:  Provided pt with [employer] information and suicide hotline  He denies depression       5. Multiple food allergies  -     Ambulatory Referral to Allergy         I spent 30 minutes caring for Vince on this date of service. This time includes time spent by me in the following activities:preparing for the visit, reviewing tests, obtaining and/or reviewing a separately obtained history, performing a medically appropriate examination and/or evaluation , counseling and educating the patient/family/caregiver, ordering medications, tests, or procedures, referring and communicating with other health care professionals  and documenting information in the medical record  Follow Up   Return in about 1 week (around 7/19/2022) for Medicare Wellness.  Patient was given instructions and counseling regarding his condition or  for health maintenance advice. Please see specific information pulled into the AVS if appropriate.

## 2022-07-13 PROBLEM — Z91.018 MULTIPLE FOOD ALLERGIES: Status: ACTIVE | Noted: 2022-07-13

## 2022-07-13 NOTE — ASSESSMENT & PLAN NOTE
No signs of hypertension; blood pressure normal  Pt is not on medication   Continue diet and exercise

## 2022-07-14 NOTE — ASSESSMENT & PLAN NOTE
COPD  Patient taking inhalers as needed;   Pt will benefit from a home nebulizer  Side effects of all new and old medications reviewed with the patient -willing to accept all risks involved.  Advised to rto if no improvement or worsening of symptoms.  Patient instructed to  clinical summary at .     Radiology to read final chest xray report

## 2022-07-19 ENCOUNTER — OFFICE VISIT (OUTPATIENT)
Dept: FAMILY MEDICINE CLINIC | Facility: CLINIC | Age: 52
End: 2022-07-19

## 2022-07-19 VITALS
DIASTOLIC BLOOD PRESSURE: 78 MMHG | RESPIRATION RATE: 16 BRPM | OXYGEN SATURATION: 98 % | HEART RATE: 71 BPM | HEIGHT: 72 IN | BODY MASS INDEX: 25.87 KG/M2 | WEIGHT: 191 LBS | SYSTOLIC BLOOD PRESSURE: 122 MMHG

## 2022-07-19 DIAGNOSIS — J44.9 CHRONIC OBSTRUCTIVE PULMONARY DISEASE, UNSPECIFIED COPD TYPE: ICD-10-CM

## 2022-07-19 DIAGNOSIS — F43.10 PTSD (POST-TRAUMATIC STRESS DISORDER): ICD-10-CM

## 2022-07-19 DIAGNOSIS — Z11.59 ENCOUNTER FOR HEPATITIS C SCREENING TEST FOR LOW RISK PATIENT: ICD-10-CM

## 2022-07-19 DIAGNOSIS — J30.2 SEASONAL ALLERGIES: ICD-10-CM

## 2022-07-19 DIAGNOSIS — E78.5 HYPERLIPIDEMIA, UNSPECIFIED HYPERLIPIDEMIA TYPE: ICD-10-CM

## 2022-07-19 DIAGNOSIS — Z00.00 MEDICARE ANNUAL WELLNESS VISIT, SUBSEQUENT: Primary | ICD-10-CM

## 2022-07-19 DIAGNOSIS — I10 ESSENTIAL HYPERTENSION: ICD-10-CM

## 2022-07-19 DIAGNOSIS — E11.9 TYPE 2 DIABETES MELLITUS WITHOUT COMPLICATION, WITHOUT LONG-TERM CURRENT USE OF INSULIN: ICD-10-CM

## 2022-07-19 LAB
ALBUMIN SERPL-MCNC: 4.4 G/DL (ref 3.5–5.2)
ALBUMIN UR-MCNC: 20 MG/L (ref 0–20)
ALBUMIN/GLOB SERPL: 1.2 G/DL
ALP SERPL-CCNC: 98 U/L (ref 39–117)
ALT SERPL W P-5'-P-CCNC: 37 U/L (ref 1–41)
ANION GAP SERPL CALCULATED.3IONS-SCNC: 14.2 MMOL/L (ref 5–15)
AST SERPL-CCNC: 28 U/L (ref 1–40)
BILIRUB SERPL-MCNC: 0.4 MG/DL (ref 0–1.2)
BUN SERPL-MCNC: 9 MG/DL (ref 6–20)
BUN/CREAT SERPL: 10.1 (ref 7–25)
CALCIUM SPEC-SCNC: 9.7 MG/DL (ref 8.6–10.5)
CHLORIDE SERPL-SCNC: 99 MMOL/L (ref 98–107)
CHOLEST SERPL-MCNC: 161 MG/DL (ref 0–200)
CO2 SERPL-SCNC: 21.8 MMOL/L (ref 22–29)
CREAT SERPL-MCNC: 0.89 MG/DL (ref 0.76–1.27)
EGFRCR SERPLBLD CKD-EPI 2021: 103.8 ML/MIN/1.73
ERYTHROCYTE [DISTWIDTH] IN BLOOD BY AUTOMATED COUNT: 12.4 % (ref 12.3–15.4)
GLOBULIN UR ELPH-MCNC: 3.6 GM/DL
GLUCOSE SERPL-MCNC: 379 MG/DL (ref 65–99)
HBA1C MFR BLD: 10.6 % (ref 4.8–5.6)
HCT VFR BLD AUTO: 48.8 % (ref 37.5–51)
HCV AB SER DONR QL: NORMAL
HDLC SERPL-MCNC: 49 MG/DL (ref 40–60)
HGB BLD-MCNC: 16 G/DL (ref 13–17.7)
LDLC SERPL CALC-MCNC: 74 MG/DL (ref 0–100)
LDLC/HDLC SERPL: 1.33 {RATIO}
LYMPHOCYTES # BLD AUTO: 2.4 10*3/MM3 (ref 0.7–3.1)
LYMPHOCYTES NFR BLD AUTO: 26.3 % (ref 19.6–45.3)
MCH RBC QN AUTO: 29.2 PG (ref 26.6–33)
MCHC RBC AUTO-ENTMCNC: 32.8 G/DL (ref 31.5–35.7)
MCV RBC AUTO: 89 FL (ref 79–97)
MONOCYTES # BLD AUTO: 0.4 10*3/MM3 (ref 0.1–0.9)
MONOCYTES NFR BLD AUTO: 4.7 % (ref 5–12)
NEUTROPHILS NFR BLD AUTO: 6.3 10*3/MM3 (ref 1.7–7)
NEUTROPHILS NFR BLD AUTO: 69 % (ref 42.7–76)
PLATELET # BLD AUTO: 245 10*3/MM3 (ref 140–450)
PMV BLD AUTO: 8.5 FL (ref 6–12)
POTASSIUM SERPL-SCNC: 4.1 MMOL/L (ref 3.5–5.2)
PROT SERPL-MCNC: 8 G/DL (ref 6–8.5)
RBC # BLD AUTO: 5.48 10*6/MM3 (ref 4.14–5.8)
SODIUM SERPL-SCNC: 135 MMOL/L (ref 136–145)
TRIGL SERPL-MCNC: 233 MG/DL (ref 0–150)
VLDLC SERPL-MCNC: 38 MG/DL (ref 5–40)
WBC NRBC COR # BLD: 9.1 10*3/MM3 (ref 3.4–10.8)

## 2022-07-19 PROCEDURE — 85025 COMPLETE CBC W/AUTO DIFF WBC: CPT | Performed by: NURSE PRACTITIONER

## 2022-07-19 PROCEDURE — G0439 PPPS, SUBSEQ VISIT: HCPCS | Performed by: NURSE PRACTITIONER

## 2022-07-19 PROCEDURE — 36415 COLL VENOUS BLD VENIPUNCTURE: CPT | Performed by: NURSE PRACTITIONER

## 2022-07-19 PROCEDURE — 1170F FXNL STATUS ASSESSED: CPT | Performed by: NURSE PRACTITIONER

## 2022-07-19 PROCEDURE — 83036 HEMOGLOBIN GLYCOSYLATED A1C: CPT | Performed by: NURSE PRACTITIONER

## 2022-07-19 PROCEDURE — 86803 HEPATITIS C AB TEST: CPT | Performed by: NURSE PRACTITIONER

## 2022-07-19 PROCEDURE — 80061 LIPID PANEL: CPT | Performed by: NURSE PRACTITIONER

## 2022-07-19 PROCEDURE — 82043 UR ALBUMIN QUANTITATIVE: CPT | Performed by: NURSE PRACTITIONER

## 2022-07-19 PROCEDURE — 1160F RVW MEDS BY RX/DR IN RCRD: CPT | Performed by: NURSE PRACTITIONER

## 2022-07-19 PROCEDURE — 80053 COMPREHEN METABOLIC PANEL: CPT | Performed by: NURSE PRACTITIONER

## 2022-07-19 RX ORDER — ONDANSETRON 4 MG/1
4 TABLET, ORALLY DISINTEGRATING ORAL
COMMUNITY
Start: 2022-04-20

## 2022-07-19 RX ORDER — ALBUTEROL SULFATE 90 UG/1
AEROSOL, METERED RESPIRATORY (INHALATION)
COMMUNITY
Start: 2022-03-30

## 2022-07-19 RX ORDER — MELOXICAM 15 MG/1
TABLET ORAL
COMMUNITY
Start: 2022-07-03

## 2022-07-19 RX ORDER — ALBUTEROL SULFATE 2.5 MG/3ML
2.5 SOLUTION RESPIRATORY (INHALATION) EVERY 4 HOURS PRN
Qty: 120 EACH | Refills: 12 | Status: SHIPPED | OUTPATIENT
Start: 2022-07-19 | End: 2022-08-18

## 2022-07-19 NOTE — PROGRESS NOTES
The ABCs of the Annual Wellness Visit  Subsequent Medicare Wellness Visit    Chief Complaint   Patient presents with   • Medicare Wellness-Initial Visit      Subjective    History of Present Illness:  Vince Urbina is a 52 y.o. male who presents for a Subsequent Medicare Wellness Visit.    The following portions of the patient's history were reviewed and   updated as appropriate: allergies, current medications, past family history, past medical history, past social history, past surgical history and problem list.    Compared to one year ago, the patient feels his physical   health is the same.    Compared to one year ago, the patient feels his mental   health is the same.    Recent Hospitalizations:  He was not admitted to the hospital during the last year.       Current Medical Providers:  Patient Care Team:  Summer Can APRN as PCP - General (Nurse Practitioner)    Outpatient Medications Prior to Visit   Medication Sig Dispense Refill   • albuterol (PROVENTIL) (5 MG/ML) 0.5% nebulizer solution Take 0.5 mL by nebulization Every 6 (Six) Hours As Needed for Wheezing. 0.5 mL 5   • albuterol sulfate  (90 Base) MCG/ACT inhaler   2 Puff, Inhalation, Q6H, PRN Shortness of Breath, 0 Refill(s)     • Alcohol Swabs ([facility] PREP) 70 % pads      • atorvastatin (LIPITOR) 40 MG tablet Take 40 mg by mouth every night at bedtime.     • Breo Ellipta 100-25 MCG/INH inhaler Inhale 1 puff Daily. 1 each 3   • meloxicam (MOBIC) 15 MG tablet TAKE 1 TABLET BY MOUTH EVERY DAY AS NEEDED FOR PAIN     • metFORMIN (GLUCOPHAGE) 500 MG tablet TAKE ONE TABLET BY MOUTH AT BEDTIME FOR 7 DAYS. THEN 1 TABLET TWICE DAILY FOR 7 DAYS THEN 2 TABLET TWICE DAILY     • ondansetron ODT (ZOFRAN-ODT) 4 MG disintegrating tablet 4 mg.       No facility-administered medications prior to visit.       No opioid medication identified on active medication list. I have reviewed chart for other potential  high risk medication/s and harmful drug interactions in  Wound Healing Center Followup Visit Note    Referring Physician : Ada Haines MD  [address] [place] [address] RECORD NUMBER:  [id number]  AGE: 60 y.o. GENDER: female  : 1956  EPISODE DATE:  2022    Subjective:     Chief Complaint   Patient presents with    Wound Check     Left leg      HISTORY of PRESENT ILLNESS AMELIA Gupta is a 60 y.o. female who presents today in regards to follow up evaluation and treatment of wound/ulcer. That patient's past medical, family and social hx were reviewed and changes were made if present. History of Wound Context:  The patient has had a wound of her left ankle/calf which was first noted approximately 2021. This has been treated local wound care. On their initial visit to the wound healing center, 22,  the patient has noted that the wound has been improving. The patient has not had similar previous wounds in the past.      She started seeing Dr. Cheyenne Jiang in 2021 and than Dr. Zulema Michaud. She was started in [facility] ~ 2021. She has noticed some improvement since starting unna boot. She is currently following with Dr. Dandre Macias. Pt is not on abx at time of initial visit, but has been treated with previously by podiatry. She is not a DM. She is not a smoker. She denies hx of DVT, and per her report had recent us noting no evidence of dvt at [facility]. She also had arterial studies done. I had previously seen her in the past in regards to left buttock thigh wound, which started as abscess. Pt works at [employer] in [place] [facility] and is on her feet all day.     22  · Reflux study - if significant findings will schedule for fu  · Continue compression therapy [facility] per podiatry with aquacell dressing  · Elevation  · She does not have significant arterial occlusive disease  22  · Patient has asked to continuing following with me going forward because of our previous relationship  · She is going to let Dr. Luu Ar office know  · She tolerated unna boot and aquacell - some improvement  216/22  · Appearance improved, slightly larger  · Consider drawtek next week but overall drainage seems reasonably managed as periwound appears ok  2/23/2022  · The wound, has some exudate, no recent cultures were done, will do wound cultures today  3/2/22  · Reflux study reviewed - no significant reflux  · Will treat culture - augmentin 875 mg bid x 10 days - script sent  · More drainage - stable size  3/9/22  · Wound appearance improved, stable in size  · drawtek  3/16/22  · Wound slightly larger in size, new wound of ankle  · Continue Drawtek, change to profore  · Avoid shoes which will contact ankle area  3/23/22  · Wounds stable  · Overall appearance improved  · Will have pt see ID re cultures - disc with Dr Mily Shabazz  3/30/22  · Much improved appearance  · On oral abx per ID - concerns re pt ability to work while on IV - will see how her wound progresses  4/6/22  · Appearance improved but size not much different  · Finished oral abx  · Will re culture next week if no significant size change - possible advance skin therapy  4/20/2022  · Ulcer on the medial aspect, fairly clean and granulating, ulcer of the lateral aspect, has some exudate, debrided      Wound/Ulcer Pain Timing/Severity: constant, moderate  Quality of pain: aching, throbbing, tender  Severity:  5 / 10   Modifying Factors: Pain worsens with dressing changes, debridement  Associated Signs/Symptoms: edema, drainage and pain    Ulcer Identification:  Ulcer Type: venous  Contributing Factors: edema and venous stasis    Diabetic/Pressure/Non Pressure Ulcers only:  Ulcer: Non-Pressure ulcer, fat layer exposed        PAST MEDICAL HISTORY      Diagnosis Date    Dizziness - light-headed     GERD (gastroesophageal reflux disease)     Herpes dermatitis 4/27/2017    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          Aspirin is not on active medication list.  Aspirin use is not indicated based on review of current medical condition/s. Risk of harm outweighs potential benefits.  .    Patient Active Problem List   Diagnosis   • Essential hypertension   • PTSD (post-traumatic stress disorder)   • Anxiety   • COPD (chronic obstructive pulmonary disease) (HCC)   • Tear of medial meniscus of right knee, current   • Multiple food allergies   • Encounter for hepatitis C screening test for low risk patient   • Hyperlipidemia   • Seasonal allergies   • Type 2 diabetes mellitus without complication, without long-term current use of insulin (HCC)     Advance Care Planning  Advance Directive is not on file.  ACP discussion was held with the patient during this visit. Patient has an advance directive (not in EMR), copy requested.    Review of Systems   Constitutional: Negative for activity change and appetite change.   HENT: Negative for congestion, sore throat and voice change.    Eyes: Negative for visual disturbance.   Respiratory: Negative for cough, shortness of breath and wheezing.    Cardiovascular: Negative for chest pain and palpitations.   Gastrointestinal: Negative for abdominal distention, abdominal pain and anal bleeding.   Endocrine: Negative for cold intolerance and heat intolerance.   Genitourinary: Negative for difficulty urinating, dysuria and urgency.   Musculoskeletal: Negative for arthralgias, back pain and gait problem.   Skin: Negative for color change, pallor and rash.   Allergic/Immunologic: Negative for environmental allergies, food allergies and immunocompromised state.   Neurological: Negative for tremors, seizures, speech difficulty and weakness.   Hematological: Negative for adenopathy. Does not bruise/bleed easily.   Psychiatric/Behavioral: Negative for suicidal ideas. The patient is not nervous/anxious.         Objective    Vitals:    07/19/22 1350   BP: 122/78   BP Location: Right arm  "  Patient Position: Sitting   Cuff Size: Adult   Pulse: 71   Resp: 16   SpO2: 98%   Weight: 86.6 kg (191 lb)   Height: 182.9 cm (72.01\")     Estimated body mass index is 25.9 kg/m² as calculated from the following:    Height as of this encounter: 182.9 cm (72.01\").    Weight as of this encounter: 86.6 kg (191 lb).    BMI is >= 25 and <30. (Overweight) The following options were offered after discussion;: weight loss educational material (shared in after visit summary)      Does the patient have evidence of cognitive impairment? No    Physical Exam  Constitutional:       General: He is not in acute distress.     Appearance: Normal appearance. He is not ill-appearing, toxic-appearing or diaphoretic.   HENT:      Head: Normocephalic.      Right Ear: Tympanic membrane and external ear normal. There is no impacted cerumen.      Left Ear: Tympanic membrane and external ear normal. There is no impacted cerumen.      Nose: Nose normal. No congestion or rhinorrhea.      Mouth/Throat:      Mouth: Mucous membranes are moist.      Pharynx: Oropharynx is clear. No oropharyngeal exudate or posterior oropharyngeal erythema.   Eyes:      General:         Right eye: No discharge.         Left eye: No discharge.      Extraocular Movements: Extraocular movements intact.      Conjunctiva/sclera: Conjunctivae normal.      Pupils: Pupils are equal, round, and reactive to light.   Neck:      Vascular: No carotid bruit.   Cardiovascular:      Rate and Rhythm: Normal rate and regular rhythm.      Pulses: Normal pulses.      Heart sounds: Normal heart sounds. No murmur heard.    No friction rub. No gallop.   Pulmonary:      Effort: Pulmonary effort is normal. No respiratory distress.      Breath sounds: Normal breath sounds. No wheezing, rhonchi or rales.   Chest:      Chest wall: No tenderness.   Abdominal:      General: Abdomen is flat. Bowel sounds are normal. There is no distension.      Palpations: Abdomen is soft. There is no mass.     " 7/20/2016    Venous stasis ulcer of left ankle with fat layer exposed with varicose veins ([address] 2/2/2022     Past Surgical History:   Procedure Laterality Date    CHOLECYSTECTOMY, LAPAROSCOPIC  04/08/2014    TONSILLECTOMY  1960    1960s    UPPER GASTROINTESTINAL ENDOSCOPY  12/13/2013    mild gastritis and small hiatal hernia, Dr Marce Mckenzie, [facility]  2015    GERD, Dr. Dayana Lopez, office     Family History   Problem Relation Age of Onset    Diabetes Mother     Hypertension Mother     Heart Disease Father     Heart Attack Father     Heart Surgery Father         angioplasty    Stroke Father     High Cholesterol Father     Heart Attack Maternal Grandfather      Social History     Tobacco Use    Smoking status: Never Smoker    Smokeless tobacco: Never Used   Vaping Use    Vaping Use: Never used   Substance Use Topics    Alcohol use: No    Drug use: No     Allergies   Allergen Reactions    Bee Pollen Anaphylaxis    Tetracyclines & Related Hives     Current Outpatient Medications on File Prior to Encounter   Medication Sig Dispense Refill    butalbital-acetaminophen-caffeine (FIORICET, ESGIC) -40 MG per tablet Take 1 tablet by mouth 3 times daily as needed for Headaches or Migraine Indications: Cluster Headache 90 tablet 5    omeprazole (PRILOSEC) 40 MG delayed release capsule Take 1 capsule by mouth daily 90 capsule 3    hydrOXYzine (ATARAX) 25 MG tablet take 1 tablet BID 60 tablet 5    aspirin-acetaminophen-caffeine (EXCEDRIN MIGRAINE) 250-250-65 MG per tablet Take 1 tablet by mouth as needed for Headaches 300 tablet 3    acyclovir (ZOVIRAX) 400 MG tablet take 1 tablet by mouth once daily 90 tablet 3    EPINEPHrine (EPIPEN) 0.3 MG/0.3ML SOAJ injection Use as directed for allergic reaction 1 each 5     No current facility-administered medications on file prior to encounter.        REVIEW OF SYSTEMS See HPI    Objective:    BP (!) 162/80   Pulse 63   Temp 97.4 °F  Tenderness: There is no abdominal tenderness. There is no guarding or rebound.      Hernia: No hernia is present.   Musculoskeletal:         General: No swelling or tenderness. Normal range of motion.      Cervical back: Normal range of motion and neck supple. No tenderness.   Skin:     General: Skin is warm and dry.      Coloration: Skin is not jaundiced or pale.      Findings: No erythema or rash.   Neurological:      Mental Status: He is alert and oriented to person, place, and time.      Sensory: No sensory deficit.      Motor: No weakness.      Gait: Gait normal.   Psychiatric:         Mood and Affect: Mood normal.         Behavior: Behavior normal.         Thought Content: Thought content normal.         Judgment: Judgment normal.                 HEALTH RISK ASSESSMENT    Smoking Status:  Social History     Tobacco Use   Smoking Status Never Smoker   Smokeless Tobacco Never Used     Alcohol Consumption:  Social History     Substance and Sexual Activity   Alcohol Use Yes     Fall Risk Screen:    RUSTADI Fall Risk Assessment was completed, and patient is at HIGH risk for falls. Assessment completed on:7/19/2022    Depression Screening:  PHQ-2/PHQ-9 Depression Screening 7/19/2022   Little Interest or Pleasure in Doing Things 0-->not at all   Feeling Down, Depressed or Hopeless 1-->several days   Trouble Falling or Staying Asleep, or Sleeping Too Much -   Feeling Tired or Having Little Energy -   Poor Appetite or Overeating -   Feeling Bad about Yourself - or that You are a Failure or Have Let Yourself or Your Family Down -   Trouble Concentrating on Things, Such as Reading the Newspaper or Watching Television -   Moving or Speaking So Slowly that Other People Could Have Noticed? Or the Opposite - Being So Fidgety -   Thoughts that You Would be Better Off Dead or of Hurting Yourself in Some Way -   PHQ-9: Brief Depression Severity Measure Score 1   If You Checked Off Any Problems, How Difficult Have These Problems  (36.3 °C) (Tympanic)   Resp 18   Wt 180 lb (81.6 kg)   BMI 27.37 kg/m²   Wt Readings from Last 3 Encounters:   04/20/22 180 lb (81.6 kg)   04/13/22 180 lb (81.6 kg)   04/06/22 180 lb (81.6 kg)     PHYSICAL EXAM  CONSTITUTIONAL:   Awake, alert, cooperative   EYES:  lids and lashes normal   ENT: external ears and nose without lesions   NECK:  supple, symmetrical, trachea midline   SKIN:  Open wound Present    Assessment:     Problem List Items Addressed This Visit     Venous stasis ulcer of left ankle with fat layer exposed with varicose veins ([address] - Primary (Chronic)    Relevant Orders    Initiate Outpatient Wound Care Protocol          Pre Debridement Measurements:  Are located in the [facility]  Documentation Flow Sheet  Post Debridement Measurements:  Wound/Ulcer Descriptions are Pre Debridement except measurements:     Wound 08/10/16 Other (Comment) Buttocks Left;Distal #2 acq 8/4/16 (Active)   Number of days: 2078       Wound 08/31/16 Buttocks Left;Proximal #3 aquired 8-27-26 (Active)   Number of days: 2057       Incision 04/08/14 Abdomen (Active)   Number of days: 7824       Wound 02/02/22 Ankle Left;Medial #1 (Active)   Wound Image   04/20/22 0734   Dressing Status New dressing applied 04/06/22 0921   Wound Cleansed Cleansed with saline 04/06/22 0921   Dressing/Treatment ABD; Other (comment) 04/06/22 0921   Offloading for Diabetic Foot Ulcers Offloading not required 03/17/22 1342   Wound Length (cm) 7.9 cm 04/20/22 0734   Wound Width (cm) 5 cm 04/20/22 0734   Wound Depth (cm) 0.2 cm 04/20/22 0734   Wound Surface Area (cm^2) 39.5 cm^2 04/20/22 0734   Change in Wound Size % (l*w) -45.97 04/20/22 0734   Wound Volume (cm^3) 7.9 cm^3 04/20/22 0734   Wound Healing % -192 04/20/22 0734   Post-Procedure Length (cm) 8.2 cm 04/13/22 0820   Post-Procedure Width (cm) 4.8 cm 04/13/22 0820   Post-Procedure Depth (cm) 0.2 cm 04/13/22 0820   Post-Procedure Surface Area (cm^2) 39.36 cm^2 04/13/22 0820   Post-Procedure Made It For You to Do Your Work, Take Care of Things at Home, or Get Along with Other People? -       Health Habits and Functional and Cognitive Screening:  Functional & Cognitive Status 7/19/2022   Do you have difficulty preparing food and eating? No   Do you have difficulty bathing yourself, getting dressed or grooming yourself? No   Do you have difficulty using the toilet? No   Do you have difficulty moving around from place to place? Yes   Do you have trouble with steps or getting out of a bed or a chair? Yes   Current Diet Unhealthy Diet   Dental Exam Not up to date   Eye Exam Not up to date   Exercise (times per week) 7 times per week   Current Exercises Include Walking   Do you need help using the phone?  Yes   Are you deaf or do you have serious difficulty hearing?  No   Do you need help with transportation? No   Do you need help shopping? Yes   Do you need help preparing meals?  No   Do you need help with housework?  No   Do you need help with laundry? No   Do you need help taking your medications? Yes   Do you need help managing money? No   Do you ever drive or ride in a car without wearing a seat belt? No   Have you felt unusual stress, anger or loneliness in the last month? Yes   Who do you live with? Other   If you need help, do you have trouble finding someone available to you? No   Do you have difficulty concentrating, remembering or making decisions? Yes       Age-appropriate Screening Schedule:  Refer to the list below for future screening recommendations based on patient's age, sex and/or medical conditions. Orders for these recommended tests are listed in the plan section. The patient has been provided with a written plan.    Health Maintenance   Topic Date Due   • URINE MICROALBUMIN  Never done   • DIABETIC FOOT EXAM  Never done   • DIABETIC EYE EXAM  11/05/2020   • ZOSTER VACCINE (1 of 2) Never done   • HEMOGLOBIN A1C  12/14/2021   • INFLUENZA VACCINE  10/01/2022   • TDAP/TD VACCINES (2 - Td or  Volume (cm^3) 7.872 cm^3 04/13/22 0820   Wound Assessment Fibrin;Granulation tissue 04/20/22 0734   Drainage Amount Large 04/20/22 0734   Drainage Description Yellow;Green;Brown 04/20/22 0734   Odor None 04/20/22 0734   Melany-wound Assessment Dry/flaky 04/20/22 0734   Number of days: 76       Wound 03/16/22 Ankle Posterior; Left #2 (Active)   Wound Image   04/20/22 0734   Dressing Status New dressing applied 04/13/22 0838   Wound Cleansed Cleansed with saline 04/13/22 0838   Dressing/Treatment Dry dressing;ABD 04/13/22 0838   Offloading for Diabetic Foot Ulcers Offloading not required 03/23/22 0917   Wound Length (cm) 2.5 cm 04/20/22 0734   Wound Width (cm) 1.8 cm 04/20/22 0734   Wound Depth (cm) 0.4 cm 04/20/22 0734   Wound Surface Area (cm^2) 4.5 cm^2 04/20/22 0734   Change in Wound Size % (l*w) -80 04/20/22 0734   Wound Volume (cm^3) 1.8 cm^3 04/20/22 0734   Wound Healing % -620 04/20/22 0734   Post-Procedure Length (cm) 2.5 cm 04/13/22 0820   Post-Procedure Width (cm) 1.6 cm 04/13/22 0820   Post-Procedure Depth (cm) 0.5 cm 04/13/22 0820   Post-Procedure Surface Area (cm^2) 4 cm^2 04/13/22 0820   Post-Procedure Volume (cm^3) 2 cm^3 04/13/22 0820   Wound Assessment Fibrin;Pink/red;Slough 04/20/22 0734   Drainage Amount Moderate 04/20/22 0734   Drainage Description Joeline Jasiel 04/20/22 0734   Odor None 04/20/22 0734   Melany-wound Assessment Dry/flaky 04/20/22 0734   Number of days: 35          Procedure Note  Indications:  Based on my examination of this patient's wound(s)/ulcer(s) today, debridement is required to promote healing and evaluate the wound base. Performed by: Andrew Gomez MD    Consent obtained:  Yes    Time out taken:  Yes    Pain Control: Anesthetic  Anesthetic: 4% Lidocaine Liquid Topical     Debridement:Excisional Debridement    Using curette the wound(s)/ulcer(s) was/were sharply debrided down through and including the removal of epidermis, dermis and subcutaneous tissue.         Devitalized Tdap) 04/18/2032              Assessment & Plan   CMS Preventative Services Quick Reference  Risk Factors Identified During Encounter  Depression/Dysphoria  The above risks/problems have been discussed with the patient.  Follow up actions/plans if indicated are seen below in the Assessment/Plan Section.  Pertinent information has been shared with the patient in the After Visit Summary.    Diagnoses and all orders for this visit:    1. Medicare annual wellness visit, subsequent (Primary)  -     CBC & Differential  -     Comprehensive Metabolic Panel  -     Lipid Panel  -     Hemoglobin A1c  -     MicroAlbumin, Urine, Random - Urine, Clean Catch    2. Essential hypertension  -     CBC & Differential  -     Comprehensive Metabolic Panel    3. Chronic obstructive pulmonary disease, unspecified COPD type (HCC)  -     CBC & Differential    4. PTSD (post-traumatic stress disorder)  -     CBC & Differential    5. Type 2 diabetes mellitus without complication, without long-term current use of insulin (HCC)  -     CBC & Differential  -     Hemoglobin A1c  -     MicroAlbumin, Urine, Random - Urine, Clean Catch    6. Seasonal allergies  -     CBC & Differential    7. Hyperlipidemia, unspecified hyperlipidemia type  -     Lipid Panel    8. Encounter for hepatitis C screening test for low risk patient  -     Hepatitis C antibody    Counseling was provided on nutrition, physical activity, development, and injury prevention, dental health, and safe sex practices patient verbalizes understanding no additional questions were asked.      Follow Up:   No follow-ups on file.     An After Visit Summary and PPPS were made available to the patient.          I spent 30 minutes caring for Vince on this date of service. This time includes time spent by me in the following activities:preparing for the visit, reviewing tests, obtaining and/or reviewing a separately obtained history, performing a medically appropriate examination and/or  Tissue Debrided:  fibrin, biofilm, slough and exudate to stimulate bleeding to promote healing, post debridement good bleeding base and wound edges noted    Wound/Ulcer #: 1, 2    Percent of Wound/Ulcer Debrided: 80%    Total Surface Area Debrided:  30 sq cm     Estimated Blood Loss:  Minimal  Hemostasis Achieved:  by pressure    Procedural Pain:  6 / 10   Post Procedural Pain:  5 / 10     Response to treatment:  With complaints of pain. Plan:   Treatment Note please see attached Discharge Instructions    Written patient dismissal instructions given to patient and signed by patient or POA. Discharge Instructions       Visit Discharge/Physician Orders     Discharge condition: Stable     Assessment of pain at discharge: yes     Anesthetic used: 4% lidocaine solution     Discharge to: Home     Left via:Private automobile     Accompanied by:self     ECF/HHA: n/a     Dressing Orders:LEFT MEDIAL LOWER LEG-Cleanse with normal saline, apply drawtex, ABD pad and profore wrap, change weekly.     Treatment Orders: Eat a diet high in protein and vitamin C. Take a multiple vitamin daily unless contraindicated.      To see Dr. Yaima Pritchett in future- his office to call     Must wear slip on shoe to work due to wrap on leg!     [address] [place] [address],[address] followup visit : 1 week_____________________________  (Please note your next appointment above and if you are unable to keep, kindly give a 24 hour notice.  Thank you.)     Physician signature:__________________________     If you experience any of the following, please call the [facility] [employer] during business hours:     * Increase in Pain  * Temperature over 101  * Increase in drainage from your wound  * Drainage with a foul odor  * Bleeding  * Increase in swelling  * Need for compression bandage changes due to slippage, breakthrough drainage.     If you need medical attention outside of the business hours of the [facility] [employer] [facility] please contact your PCP or go to the nearest emergency room.    evaluation , counseling and educating the patient/family/caregiver, ordering medications, tests, or procedures, referring and communicating with other health care professionals , documenting information in the medical record, independently interpreting results and communicating that information with the patient/family/caregiver and care coordination                                                 Electronically signed by Fatmata Sen MD on 4/20/2022 at 8:09 AM

## 2022-07-31 DIAGNOSIS — E11.9 TYPE 2 DIABETES MELLITUS WITHOUT COMPLICATION, WITHOUT LONG-TERM CURRENT USE OF INSULIN: Primary | ICD-10-CM

## 2022-07-31 RX ORDER — LISINOPRIL 2.5 MG/1
2.5 TABLET ORAL DAILY
Qty: 90 TABLET | Refills: 1 | Status: SHIPPED | OUTPATIENT
Start: 2022-07-31

## 2022-08-23 NOTE — TELEPHONE ENCOUNTER
Caller: Vince Urbina    Relationship: Self    Best call back number: [phone]  Requested Prescriptions:   Requested Prescriptions     Pending Prescriptions Disp Refills   • dapagliflozin Propanediol 10 MG tablet 30 tablet 0     Sig: Take 10 mg by mouth Daily.        Pharmacy where request should be sent: [id number] [address] AT [facility] - [phone] [facility] [phone] FX     Additional details provided by patient: THIS WAS SENT TO THE WRONG PHARMACY,  SHOULD HAVE BEEN [facility]    Does the patient have less than a 3 day supply:  [x] Yes  [] No    Levi Gill Rep   08/23/22 07:58 EDT

## 2022-09-06 ENCOUNTER — APPOINTMENT (OUTPATIENT)
Dept: GENERAL RADIOLOGY | Facility: HOSPITAL | Age: 52
End: 2022-09-06

## 2022-09-06 ENCOUNTER — HOSPITAL ENCOUNTER (EMERGENCY)
Facility: HOSPITAL | Age: 52
Discharge: HOME | End: 2022-09-06
Attending: EMERGENCY MEDICINE | Admitting: EMERGENCY MEDICINE

## 2022-09-06 VITALS
BODY MASS INDEX: 27.5 KG/M2 | WEIGHT: 196.43 LBS | OXYGEN SATURATION: 99 % | HEART RATE: 80 BPM | TEMPERATURE: 98.5 F | DIASTOLIC BLOOD PRESSURE: 90 MMHG | HEIGHT: 71 IN | SYSTOLIC BLOOD PRESSURE: 131 MMHG | RESPIRATION RATE: 20 BRPM

## 2022-09-06 DIAGNOSIS — R07.9 CHEST PAIN, UNSPECIFIED TYPE: Primary | ICD-10-CM

## 2022-09-06 LAB
ALBUMIN SERPL-MCNC: 4.5 G/DL (ref 3.5–5.2)
ALBUMIN/GLOB SERPL: 1.6 G/DL
ALP SERPL-CCNC: 105 U/L (ref 39–117)
ALT SERPL W P-5'-P-CCNC: 33 U/L (ref 1–41)
ANION GAP SERPL CALCULATED.3IONS-SCNC: 10.2 MMOL/L (ref 5–15)
AST SERPL-CCNC: 23 U/L (ref 1–40)
BASOPHILS # BLD AUTO: 0.04 10*3/MM3 (ref 0–0.2)
BASOPHILS NFR BLD AUTO: 0.5 % (ref 0–1.5)
BILIRUB SERPL-MCNC: 0.5 MG/DL (ref 0–1.2)
BUN SERPL-MCNC: 17 MG/DL (ref 6–20)
BUN/CREAT SERPL: 20.7 (ref 7–25)
CALCIUM SPEC-SCNC: 9.6 MG/DL (ref 8.6–10.5)
CHLORIDE SERPL-SCNC: 99 MMOL/L (ref 98–107)
CO2 SERPL-SCNC: 27.8 MMOL/L (ref 22–29)
CREAT SERPL-MCNC: 0.82 MG/DL (ref 0.76–1.27)
DEPRECATED RDW RBC AUTO: 41.8 FL (ref 37–54)
EGFRCR SERPLBLD CKD-EPI 2021: 106.4 ML/MIN/1.73
EOSINOPHIL # BLD AUTO: 0.05 10*3/MM3 (ref 0–0.4)
EOSINOPHIL NFR BLD AUTO: 0.6 % (ref 0.3–6.2)
ERYTHROCYTE [DISTWIDTH] IN BLOOD BY AUTOMATED COUNT: 13.5 % (ref 12.3–15.4)
GLOBULIN UR ELPH-MCNC: 2.8 GM/DL
GLUCOSE SERPL-MCNC: 314 MG/DL (ref 65–99)
HCT VFR BLD AUTO: 42.5 % (ref 37.5–51)
HGB BLD-MCNC: 15 G/DL (ref 13–17.7)
HOLD SPECIMEN: NORMAL
HOLD SPECIMEN: NORMAL
IMM GRANULOCYTES # BLD AUTO: 0.03 10*3/MM3 (ref 0–0.05)
IMM GRANULOCYTES NFR BLD AUTO: 0.3 % (ref 0–0.5)
LIPASE SERPL-CCNC: 57 U/L (ref 13–60)
LYMPHOCYTES # BLD AUTO: 2.25 10*3/MM3 (ref 0.7–3.1)
LYMPHOCYTES NFR BLD AUTO: 25.9 % (ref 19.6–45.3)
MAGNESIUM SERPL-MCNC: 2 MG/DL (ref 1.6–2.6)
MCH RBC QN AUTO: 30.6 PG (ref 26.6–33)
MCHC RBC AUTO-ENTMCNC: 35.3 G/DL (ref 31.5–35.7)
MCV RBC AUTO: 86.7 FL (ref 79–97)
MONOCYTES # BLD AUTO: 0.5 10*3/MM3 (ref 0.1–0.9)
MONOCYTES NFR BLD AUTO: 5.8 % (ref 5–12)
NEUTROPHILS NFR BLD AUTO: 5.81 10*3/MM3 (ref 1.7–7)
NEUTROPHILS NFR BLD AUTO: 66.9 % (ref 42.7–76)
NRBC BLD AUTO-RTO: 0 /100 WBC (ref 0–0.2)
NT-PROBNP SERPL-MCNC: 22 PG/ML (ref 0–900)
PLATELET # BLD AUTO: 236 10*3/MM3 (ref 140–450)
PMV BLD AUTO: 10.3 FL (ref 6–12)
POTASSIUM SERPL-SCNC: 3.8 MMOL/L (ref 3.5–5.2)
PROT SERPL-MCNC: 7.3 G/DL (ref 6–8.5)
RBC # BLD AUTO: 4.9 10*6/MM3 (ref 4.14–5.8)
SODIUM SERPL-SCNC: 137 MMOL/L (ref 136–145)
TROPONIN I SERPL-MCNC: 0 NG/ML (ref 0–0.08)
TROPONIN I SERPL-MCNC: 0 NG/ML (ref 0–0.08)
WBC NRBC COR # BLD: 8.68 10*3/MM3 (ref 3.4–10.8)
WHOLE BLOOD HOLD COAG: NORMAL
WHOLE BLOOD HOLD SPECIMEN: NORMAL

## 2022-09-06 PROCEDURE — 94799 UNLISTED PULMONARY SVC/PX: CPT

## 2022-09-06 PROCEDURE — 93005 ELECTROCARDIOGRAM TRACING: CPT | Performed by: EMERGENCY MEDICINE

## 2022-09-06 PROCEDURE — 99284 EMERGENCY DEPT VISIT MOD MDM: CPT

## 2022-09-06 PROCEDURE — 83880 ASSAY OF NATRIURETIC PEPTIDE: CPT | Performed by: EMERGENCY MEDICINE

## 2022-09-06 PROCEDURE — 36415 COLL VENOUS BLD VENIPUNCTURE: CPT

## 2022-09-06 PROCEDURE — 83735 ASSAY OF MAGNESIUM: CPT | Performed by: EMERGENCY MEDICINE

## 2022-09-06 PROCEDURE — 83690 ASSAY OF LIPASE: CPT | Performed by: EMERGENCY MEDICINE

## 2022-09-06 PROCEDURE — 94640 AIRWAY INHALATION TREATMENT: CPT

## 2022-09-06 PROCEDURE — 84484 ASSAY OF TROPONIN QUANT: CPT

## 2022-09-06 PROCEDURE — 85025 COMPLETE CBC W/AUTO DIFF WBC: CPT | Performed by: EMERGENCY MEDICINE

## 2022-09-06 PROCEDURE — 71045 X-RAY EXAM CHEST 1 VIEW: CPT

## 2022-09-06 PROCEDURE — 80053 COMPREHEN METABOLIC PANEL: CPT | Performed by: EMERGENCY MEDICINE

## 2022-09-06 RX ORDER — SODIUM CHLORIDE 0.9 % (FLUSH) 0.9 %
10 SYRINGE (ML) INJECTION AS NEEDED
Status: DISCONTINUED | OUTPATIENT
Start: 2022-09-06 | End: 2022-09-07 | Stop reason: HOSPADM

## 2022-09-06 RX ORDER — ASPIRIN 81 MG/1
324 TABLET, CHEWABLE ORAL ONCE
Status: DISCONTINUED | OUTPATIENT
Start: 2022-09-06 | End: 2022-09-07 | Stop reason: HOSPADM

## 2022-09-06 RX ORDER — ALBUTEROL SULFATE 90 UG/1
2 AEROSOL, METERED RESPIRATORY (INHALATION) ONCE
Status: COMPLETED | OUTPATIENT
Start: 2022-09-06 | End: 2022-09-06

## 2022-09-06 RX ADMIN — ALBUTEROL SULFATE 2 PUFF: 90 AEROSOL, METERED RESPIRATORY (INHALATION) at 22:57

## 2022-09-07 ENCOUNTER — HOSPITAL ENCOUNTER (EMERGENCY)
Facility: HOSPITAL | Age: 52
Discharge: HOME | End: 2022-09-07
Attending: EMERGENCY MEDICINE | Admitting: EMERGENCY MEDICINE

## 2022-09-07 VITALS
RESPIRATION RATE: 20 BRPM | TEMPERATURE: 98.2 F | DIASTOLIC BLOOD PRESSURE: 83 MMHG | SYSTOLIC BLOOD PRESSURE: 117 MMHG | BODY MASS INDEX: 26.64 KG/M2 | OXYGEN SATURATION: 100 % | HEIGHT: 72 IN | HEART RATE: 80 BPM

## 2022-09-07 DIAGNOSIS — M79.605 BILATERAL LEG AND FOOT PAIN: Primary | ICD-10-CM

## 2022-09-07 DIAGNOSIS — M79.672 BILATERAL LEG AND FOOT PAIN: Primary | ICD-10-CM

## 2022-09-07 DIAGNOSIS — M79.671 BILATERAL LEG AND FOOT PAIN: Primary | ICD-10-CM

## 2022-09-07 DIAGNOSIS — M79.604 BILATERAL LEG AND FOOT PAIN: Primary | ICD-10-CM

## 2022-09-07 LAB
QT INTERVAL: 379 MS
QT INTERVAL: 397 MS

## 2022-09-07 PROCEDURE — 99283 EMERGENCY DEPT VISIT LOW MDM: CPT

## 2022-09-07 RX ORDER — IBUPROFEN 400 MG/1
800 TABLET ORAL ONCE
Status: COMPLETED | OUTPATIENT
Start: 2022-09-07 | End: 2022-09-07

## 2022-09-07 RX ADMIN — IBUPROFEN 800 MG: 400 TABLET, FILM COATED ORAL at 13:31

## 2022-09-07 NOTE — ED PROVIDER NOTES
Subjective     History provided by:  Patient  Leg Pain  Location:  Leg  Time since incident:  1 hour  Injury: no    Leg location:  R leg and L leg  Pain details:     Quality:  Aching and burning    Radiates to:  Does not radiate    Severity:  Moderate    Onset quality:  Sudden    Duration:  1 hour    Timing:  Constant    Progression:  Unchanged  Chronicity:  New  Relieved by:  Nothing  Worsened by:  Activity  Ineffective treatments:  None tried  Associated symptoms: no back pain, no decreased ROM, no fatigue, no fever, no itching, no muscle weakness, no neck pain, no numbness, no stiffness, no swelling and no tingling    Risk factors: no concern for non-accidental trauma        Pt reports he was walking to [place] after discharge from this facility earlier. He reports his leg pain is severe due to walking and he cannot walk home.       Review of Systems   Constitutional: Negative for chills, fatigue and fever.   HENT: Negative for congestion, ear pain and sore throat.    Eyes: Negative for pain.   Respiratory: Negative for cough, chest tightness and shortness of breath.    Cardiovascular: Negative for chest pain and leg swelling.   Gastrointestinal: Negative for abdominal pain, diarrhea, nausea and vomiting.   Genitourinary: Negative for flank pain and hematuria.   Musculoskeletal: Negative for back pain, joint swelling, neck pain and stiffness.   Skin: Negative for itching and pallor.   Neurological: Negative for seizures and headaches.   All other systems reviewed and are negative.      Past Medical History:   Diagnosis Date   • Allergic    • Angina pectoris ([facility])    • Anxiety    • Arthritis    • Asthma    • Cataract    • COPD (chronic obstructive pulmonary disease) ([facility])    • Depression    • Essential hypertension 10/16/2019   • Headache    • Heart attack ([facility])    • Hypertension    • Panic attack    • PTSD (post-traumatic stress disorder)    • Restless leg syndrome    • Shortness of breath    • Sinusitis         Allergies   Allergen Reactions   • Codeine Shortness Of Breath   • Fruit Extracts Anaphylaxis     ALLERGIC TO BLUEBERRIES   • Other Anaphylaxis     Blueberries and mustard    • Phenergan [Promethazine Hcl] Anaphylaxis   • Promethazine Anaphylaxis   • Aspirin GI Intolerance   • Ciprofloxacin Nausea And Vomiting   • Metformin Diarrhea   • Uracil Mustard Other (See Comments)   • Vaccinium Angustifolium Other (See Comments)   • Dilaudid [Hydromorphone] Anxiety and Palpitations   • Erythromycin Rash   • Toradol [Ketorolac Tromethamine] Anxiety and Palpitations   • Tramadol Anxiety and Palpitations       Past Surgical History:   Procedure Laterality Date   • CATARACT EXTRACTION, BILATERAL     • EAR TUBES     • HAMMER TOE REPAIR     • KNEE ARTHROSCOPY Right 5/29/2020    Procedure: KNEE ARTHROSCOPY with partial lateral and medial  meniscectomy;  Surgeon: Liu Garcia MD;  Location: [facility] OR;  Service: Orthopedics;  Laterality: Right;   • TONSILLECTOMY AND ADENOIDECTOMY     • WRIST SURGERY Right        Family History   Problem Relation Age of Onset   • Hypertension Mother    • Diabetes Mother        Social History     Socioeconomic History   • Marital status: Single   Tobacco Use   • Smoking status: Never Smoker   • Smokeless tobacco: Never Used   Substance and Sexual Activity   • Alcohol use: Not Currently   • Drug use: Never   • Sexual activity: Defer           Objective   Physical Exam  Vitals and nursing note reviewed.   Constitutional:       General: He is not in acute distress.     Appearance: Normal appearance. He is not toxic-appearing.   HENT:      Head: Normocephalic and atraumatic.      Mouth/Throat:      Mouth: Mucous membranes are moist.   Eyes:      General: No scleral icterus.  Cardiovascular:      Rate and Rhythm: Normal rate and regular rhythm.      Pulses: Normal pulses.      Heart sounds: Normal heart sounds.   Pulmonary:      Effort: Pulmonary effort is normal. No respiratory  distress.      Breath sounds: Normal breath sounds.   Abdominal:      General: Abdomen is flat.      Palpations: Abdomen is soft.      Tenderness: There is no abdominal tenderness.   Musculoskeletal:         General: Normal range of motion.      Cervical back: Normal range of motion and neck supple.   Skin:     General: Skin is warm and dry.   Neurological:      Mental Status: He is alert and oriented to person, place, and time. Mental status is at baseline.         Procedures           ED Course                                           MDM  Number of Diagnoses or Management Options  Bilateral leg and foot pain: minor  Diagnosis management comments: I have spoken with the patient. I have explained the patient´s condition, diagnoses and treatment plan based on the information available to me at this time. I have answered the patient's questions and addressed any concerns. The patient has a good  understanding of the patient´s diagnosis, condition, and treatment plan as can be expected at this point. The vital signs have been stable. The patient´s condition is stable and appropriate for discharge from the emergency department.      The patient will pursue further outpatient evaluation with the primary care physician or other designated or consulting physician as outlined in the discharge instructions. They are agreeable to this plan of care and follow-up instructions have been explained in detail. The patient has received these instructions in written format and have expressed an understanding of the discharge instructions. The patient is aware that any significant change in condition or worsening of symptoms should prompt an immediate return to this or the closest emergency department or call to 911.       Amount and/or Complexity of Data Reviewed  Discuss the patient with other providers: yes (Er  Jessica-she spoke to pt)    Risk of Complications, Morbidity, and/or Mortality  Presenting problems:  minimal  Diagnostic procedures: minimal  Management options: minimal    Patient Progress  Patient progress: stable      Final diagnoses:   Bilateral leg and foot pain       ED Disposition  ED Disposition     ED Disposition   Discharge    Condition   Stable    Comment   --             Summer Can, APRN  [address] [address]  [phone]    In 3 days  As needed         Medication List      No changes were made to your prescriptions during this visit.          Clint Mata, APRN  09/07/22 2932

## 2022-09-07 NOTE — ED NOTES
Pt was discharged earlier and was walking and started having leg weakness he is requesting to talk to a , he states he can't walk to [place] and that is where his home is.  was notified he wants to see them he states he is needing help to get back to [place]

## 2022-09-07 NOTE — SIGNIFICANT NOTE
09/07/22 2315   Plan   Plan Comments SW met with patient to discuss transportation options. Pt reports needing a ride to [place] and that was where he was from. Pt came here from getting a [employer]. SW spoke with supervisor to explain the situation in which pt was informed that we are not obligated to pay for cab for him to get to [place]. Pt stated he was unable to contribute finances to pay for a cab. Pt attempted to contact state police to inquire about a ride home. Pt to be discharged.   Final Discharge Disposition Code 01 - home or self-care

## 2022-09-07 NOTE — ED NOTES
Pt given a sandwich and a drink and is walking around room and talking on cell phone, numbers for transportation were provided by . Pt was given discharge instructions and all questions answered. Assistance offered out of ED but pt declined. He ambulates without assistance and does not have a unsteady gait.

## 2022-09-07 NOTE — SIGNIFICANT NOTE
09/07/22 7542   Plan   Plan Comments SW met with patient to discuss transportation options. Pt reports needing a ride to [place] and that was where he was from. Pt came here from getting a [employer]. SW spoke with supervisor to explain the situation in which pt was informed that we are not obligated to pay for cab for him to get to [place]. Pt stated he was unable to contribute finances to pay for a cab. Pt attempted to contact state police to inquire about a ride home. Pt was ambulatory while here at the hospital but stated he was unable to walk. Pt to be discharged.   Final Discharge Disposition Code 01 - home or self-care

## 2022-09-07 NOTE — ED PROVIDER NOTES
Time: 8:51 PM EDT  Arrived by: ambulance  Chief Complaint: chest pain  History provided by: patient, ems  History is limited by: N/A     History of Present Illness:  Patient is a 52 y.o. year old male who presents to the emergency department with chest pain. Pt has a medical history of Hypertension, COPD, and heart attack.    Pt states that he was walking a far distance when he developed chest pains. Pt describes the pain as a pressure on his chest.    Pt denies tobacco and drug use, but claims ETOH use.       Similar Symptoms Previously: no  Recently seen: no      Patient Care Team  Primary Care Provider: Summer Can APRN    Past Medical History:     Allergies   Allergen Reactions   • Codeine Shortness Of Breath   • Fruit Extracts Anaphylaxis     ALLERGIC TO BLUEBERRIES   • Other Anaphylaxis     Blueberries and mustard    • Phenergan [Promethazine Hcl] Anaphylaxis   • Promethazine Anaphylaxis   • Aspirin GI Intolerance   • Ciprofloxacin Nausea And Vomiting   • Metformin Diarrhea   • Uracil Mustard Other (See Comments)   • Vaccinium Angustifolium Other (See Comments)   • Dilaudid [Hydromorphone] Anxiety and Palpitations   • Erythromycin Rash   • Toradol [Ketorolac Tromethamine] Anxiety and Palpitations   • Tramadol Anxiety and Palpitations     Past Medical History:   Diagnosis Date   • Allergic    • Angina pectoris ([facility])    • Anxiety    • Arthritis    • Asthma    • Cataract    • COPD (chronic obstructive pulmonary disease) ([facility])    • Depression    • Essential hypertension 10/16/2019   • Headache    • Heart attack ([facility])    • Hypertension    • Panic attack    • PTSD (post-traumatic stress disorder)    • Restless leg syndrome    • Shortness of breath    • Sinusitis      Past Surgical History:   Procedure Laterality Date   • CATARACT EXTRACTION, BILATERAL     • EAR TUBES     • HAMMER TOE REPAIR     • KNEE ARTHROSCOPY Right 5/29/2020    Procedure: KNEE ARTHROSCOPY with partial lateral and medial  meniscectomy;   Surgeon: Liu Garcia MD;  Location: [facility];  Service: Orthopedics;  Laterality: Right;   • TONSILLECTOMY AND ADENOIDECTOMY     • WRIST SURGERY Right      Family History   Problem Relation Age of Onset   • Hypertension Mother    • Diabetes Mother        Home Medications:  Prior to Admission medications    Medication Sig Start Date End Date Taking? Authorizing Provider   albuterol sulfate  (90 Base) MCG/ACT inhaler   2 Puff, Inhalation, Q6H, PRN Shortness of Breath, 0 Refill(s) 3/30/22   Phi Deluca MD   Alcohol Swabs ([facility] PREP) 70 % pads  6/23/20   Phi Deluca MD   atorvastatin (LIPITOR) 40 MG tablet Take 40 mg by mouth every night at bedtime. 6/22/20   Phi Deluca MD   Breo Ellipta 100-25 MCG/INH inhaler Inhale 1 puff Daily. 7/12/22   Summer Can APRN   dapagliflozin Propanediol 10 MG tablet Take 10 mg by mouth Daily. 8/23/22   Summer Can APRN   lisinopril (Zestril) 2.5 MG tablet Take 1 tablet by mouth Daily. 7/31/22   Summer Can APRN   meloxicam (MOBIC) 15 MG tablet TAKE 1 TABLET BY MOUTH EVERY DAY AS NEEDED FOR PAIN 7/3/22   Phi Deluca MD   metFORMIN (GLUCOPHAGE) 500 MG tablet TAKE ONE TABLET BY MOUTH AT BEDTIME FOR 7 DAYS. THEN 1 TABLET TWICE DAILY FOR 7 DAYS THEN 2 TABLET TWICE DAILY 6/22/20   Phi Deluca MD   ondansetron ODT (ZOFRAN-ODT) 4 MG disintegrating tablet 4 mg. 4/20/22   Phi Deluca MD        Social History:   Social History     Tobacco Use   • Smoking status: Never Smoker   • Smokeless tobacco: Never Used   Substance Use Topics   • Alcohol use: Not Currently   • Drug use: Never     Recent travel: not applicable     Review of Systems:  Review of Systems   Constitutional: Negative for chills and fever.   HENT: Negative for congestion, ear pain and sore throat.    Eyes: Negative for pain.   Respiratory: Negative for cough, chest tightness and shortness of breath.    Cardiovascular: Positive  "for chest pain.   Gastrointestinal: Negative for abdominal pain, diarrhea, nausea and vomiting.   Genitourinary: Negative for flank pain and hematuria.   Musculoskeletal: Negative for joint swelling.   Skin: Negative for pallor.   Neurological: Negative for seizures and headaches.   All other systems reviewed and are negative.       Physical Exam:  /94   Pulse 94   Temp 98.5 °F (36.9 °C) (Oral)   Resp 18   Ht 180.3 cm (71\")   Wt 89.1 kg (196 lb 6.9 oz)   SpO2 100%   BMI 27.40 kg/m²     Physical Exam  Vitals and nursing note reviewed.   Constitutional:       General: He is not in acute distress.     Appearance: Normal appearance. He is not toxic-appearing.   HENT:      Head: Normocephalic and atraumatic.      Jaw: There is normal jaw occlusion.   Eyes:      General: Lids are normal.      Extraocular Movements: Extraocular movements intact.      Conjunctiva/sclera: Conjunctivae normal.      Pupils: Pupils are equal, round, and reactive to light.   Cardiovascular:      Rate and Rhythm: Normal rate and regular rhythm.      Pulses: Normal pulses.      Heart sounds: Normal heart sounds.   Pulmonary:      Effort: Pulmonary effort is normal. No respiratory distress.      Breath sounds: Normal breath sounds. No wheezing or rhonchi.   Abdominal:      General: Abdomen is flat.      Palpations: Abdomen is soft.      Tenderness: There is no abdominal tenderness. There is no guarding or rebound.   Musculoskeletal:         General: Normal range of motion.      Cervical back: Normal range of motion and neck supple.      Right lower leg: No edema.      Left lower leg: No edema.   Skin:     General: Skin is warm and dry.   Neurological:      Mental Status: He is alert and oriented to person, place, and time. Mental status is at baseline.   Psychiatric:         Mood and Affect: Mood normal.                Medications in the Emergency Department:  Medications   sodium chloride 0.9 % flush 10 mL (has no administration in " time range)   aspirin chewable tablet 324 mg (324 mg Oral Not Given 9/6/22 2052)   albuterol sulfate HFA (PROVENTIL HFA;VENTOLIN HFA;PROAIR HFA) inhaler 2 puff (has no administration in time range)        Labs  Lab Results (last 24 hours)     Procedure Component Value Units Date/Time    CBC & Differential [[id number]]  (Normal) Collected: 09/06/22 2038    Specimen: Blood Updated: 09/06/22 2056    Narrative:      The following orders were created for panel order CBC & Differential.  Procedure                               Abnormality         Status                     ---------                               -----------         ------                     CBC Auto Differential[[id number]]        Normal              Final result                 Please view results for these tests on the individual orders.    Comprehensive Metabolic Panel [[id number]]  (Abnormal) Collected: 09/06/22 2038    Specimen: Blood Updated: 09/06/22 2128     Glucose 314 mg/dL      BUN 17 mg/dL      Creatinine 0.82 mg/dL      Sodium 137 mmol/L      Potassium 3.8 mmol/L      Chloride 99 mmol/L      CO2 27.8 mmol/L      Calcium 9.6 mg/dL      Total Protein 7.3 g/dL      Albumin 4.50 g/dL      ALT (SGPT) 33 U/L      AST (SGOT) 23 U/L      Alkaline Phosphatase 105 U/L      Total Bilirubin 0.5 mg/dL      Globulin 2.8 gm/dL      A/G Ratio 1.6 g/dL      BUN/Creatinine Ratio 20.7     Anion Gap 10.2 mmol/L      eGFR 106.4 mL/min/1.73      Comment: National Kidney Foundation and American Society of Nephrology (ASN) Task Force recommended calculation based on the Chronic Kidney Disease Epidemiology Collaboration (CKD-EPI) equation refit without adjustment for race.       Narrative:      GFR Normal >60  Chronic Kidney Disease <60  Kidney Failure <15      Lipase [[id number]]  (Normal) Collected: 09/06/22 2038    Specimen: Blood Updated: 09/06/22 2128     Lipase 57 U/L     BNP [[id number]]  (Normal) Collected: 09/06/22 2038    Specimen: Blood Updated: 09/06/22 2123      proBNP 22.0 pg/mL     Narrative:      Among patients with dyspnea, NT-proBNP is highly sensitive for the detection of acute congestive heart failure. In addition NT-proBNP of <300 pg/ml effectively rules out acute congestive heart failure with 99% negative predictive value.    Results may be falsely decreased if patient taking Biotin.      Magnesium [[id number]]  (Normal) Collected: 09/06/22 2038    Specimen: Blood Updated: 09/06/22 2128     Magnesium 2.0 mg/dL     CBC Auto Differential [[id number]]  (Normal) Collected: 09/06/22 2038    Specimen: Blood Updated: 09/06/22 2056     WBC 8.68 10*3/mm3      RBC 4.90 10*6/mm3      Hemoglobin 15.0 g/dL      Hematocrit 42.5 %      MCV 86.7 fL      MCH 30.6 pg      MCHC 35.3 g/dL      RDW 13.5 %      RDW-SD 41.8 fl      MPV 10.3 fL      Platelets 236 10*3/mm3      Neutrophil % 66.9 %      Lymphocyte % 25.9 %      Monocyte % 5.8 %      Eosinophil % 0.6 %      Basophil % 0.5 %      Immature Grans % 0.3 %      Neutrophils, Absolute 5.81 10*3/mm3      Lymphocytes, Absolute 2.25 10*3/mm3      Monocytes, Absolute 0.50 10*3/mm3      Eosinophils, Absolute 0.05 10*3/mm3      Basophils, Absolute 0.04 10*3/mm3      Immature Grans, Absolute 0.03 10*3/mm3      nRBC 0.0 /100 WBC     POC Troponin I [[id number]]  (Normal) Collected: 09/06/22 2041    Specimen: Blood Updated: 09/06/22 2054     Troponin I 0.00 ng/mL      Comment: Serial Number: [id number]:  [id number]       POC Troponin I [[id number]]  (Normal) Collected: 09/06/22 2223    Specimen: Blood Updated: 09/06/22 2236     Troponin I 0.00 ng/mL      Comment: Serial Number: [id number]:  [id number]              Imaging:  XR Chest 1 View    Result Date: 9/6/2022  PROCEDURE: XR CHEST 1 VW  COMPARISON: None  INDICATIONS: CHEST PAIN TODAY  FINDINGS:  Allowing for low lung volumes, the lungs appear grossly clear.  No pneumothorax or large pleural effusion is seen.  Cardiomediastinal contours appear within normal limits.       No acute  cardiopulmonary abnormality is identified.       MELINDA BACA MD       Electronically Signed and Approved By: MELINDA BACA MD on 9/06/2022 at 21:17               Procedures:  Procedures    Progress                            Medical Decision Making:  MDM  Number of Diagnoses or Management Options  Chest pain, unspecified type  Diagnosis management comments: In summary this is a 52-year-old male who presents to the emergency department after walking a long distance and complaining of chest pain.  He is low risk heart score.  His work-up including CBC, CMP, troponin x2, EKG x2, chest x-ray are all unremarkable for acute pathology.  He is chest pain-free in the emergency department without intervention.  Very strict return to ER and follow-up instructions have been provided to the patient.         Final diagnoses:   Chest pain, unspecified type        Disposition:  ED Disposition     ED Disposition   Discharge    Condition   Stable    Comment   --             This medical record created using voice recognition software.        Documentation assistance provided by STEFANI GAMBINO acting as scribe for Patric Mendez MD. Information recorded by the scribe was done at my direction and has been verified and validated by me.          Stefani Gambino  09/06/22 7171       Patric Mendez MD  09/06/22 4957       Patric Mendez MD  09/06/22 4373

## 2022-09-12 PROCEDURE — 96372 THER/PROPH/DIAG INJ SC/IM: CPT

## 2022-09-12 PROCEDURE — 99283 EMERGENCY DEPT VISIT LOW MDM: CPT

## 2022-09-13 ENCOUNTER — HOSPITAL ENCOUNTER (EMERGENCY)
Facility: HOSPITAL | Age: 52
Discharge: HOME | End: 2022-09-13
Attending: EMERGENCY MEDICINE | Admitting: EMERGENCY MEDICINE

## 2022-09-13 VITALS
RESPIRATION RATE: 16 BRPM | HEART RATE: 64 BPM | BODY MASS INDEX: 26.55 KG/M2 | DIASTOLIC BLOOD PRESSURE: 84 MMHG | TEMPERATURE: 97.4 F | SYSTOLIC BLOOD PRESSURE: 142 MMHG | WEIGHT: 196 LBS | OXYGEN SATURATION: 95 % | HEIGHT: 72 IN

## 2022-09-13 DIAGNOSIS — G44.201 ACUTE INTRACTABLE TENSION-TYPE HEADACHE: Primary | ICD-10-CM

## 2022-09-13 PROCEDURE — 96372 THER/PROPH/DIAG INJ SC/IM: CPT

## 2022-09-13 PROCEDURE — 25010000002 DIPHENHYDRAMINE PER 50 MG

## 2022-09-13 PROCEDURE — 25010000002 DROPERIDOL PER 5 MG

## 2022-09-13 RX ORDER — DICLOFENAC SODIUM 75 MG/1
75 TABLET, DELAYED RELEASE ORAL 2 TIMES DAILY
Qty: 14 TABLET | Refills: 0 | Status: SHIPPED | OUTPATIENT
Start: 2022-09-13 | End: 2022-09-20

## 2022-09-13 RX ORDER — DROPERIDOL 2.5 MG/ML
2.5 INJECTION, SOLUTION INTRAMUSCULAR; INTRAVENOUS ONCE
Status: COMPLETED | OUTPATIENT
Start: 2022-09-13 | End: 2022-09-13

## 2022-09-13 RX ORDER — DIPHENHYDRAMINE HYDROCHLORIDE 50 MG/ML
25 INJECTION INTRAMUSCULAR; INTRAVENOUS ONCE
Status: COMPLETED | OUTPATIENT
Start: 2022-09-13 | End: 2022-09-13

## 2022-09-13 RX ADMIN — DIPHENHYDRAMINE HYDROCHLORIDE 25 MG: 50 INJECTION INTRAMUSCULAR; INTRAVENOUS at 02:27

## 2022-09-13 RX ADMIN — DROPERIDOL 2.5 MG: 2.5 INJECTION, SOLUTION INTRAMUSCULAR; INTRAVENOUS at 02:26

## 2022-09-13 NOTE — ED PROVIDER NOTES
"Subjective   PIT    Chief Complaint: Headache    Context: Patient is a 52-year-old overweight  male who presents today with complaints of a headache that is been ongoing for 2 weeks.  He states when the headache is at its worst, patient has had intermittent nausea.  He currently rates his pain 10/10 and describes it as a tight feeling.  It does not radiate and he cannot identify any relieving or aggravating factors.  He does state that bright light seems to make it worse.  He denies any injury, difficulty speaking, facial droop and confusion.  He states that he also has \"an eye issue\" and his vision has been worsening.  Patient states that he was seen by an optometrist recently and received a new pair of glasses.  1 week after he saw the optometrist, he lost his glasses.  He has not followed back up because he is \"embarrassed\" at losing his lenses.  He reports a history of migraines but does not take anything for them.  He also reports a history of COPD and asthma.  He denies smoking, drugs and alcohol use.  He denies vomiting, fever, cough and congestion.    Duration: 2 weeks    Timing: Waxes and wanes    Severity: 10/10    Associated: Photophobia          Review of Systems   Constitutional: Negative for activity change, diaphoresis and fatigue.   HENT: Negative for congestion, rhinorrhea and sore throat.    Eyes: Positive for photophobia and itching.   Respiratory: Negative for chest tightness and shortness of breath.    Cardiovascular: Negative for chest pain and palpitations.   Gastrointestinal: Negative for abdominal pain, nausea and vomiting.   Endocrine: Negative.    Genitourinary: Negative for dysuria and urgency.   Musculoskeletal: Negative for arthralgias and myalgias.   Skin: Negative.    Allergic/Immunologic: Negative.    Neurological: Positive for dizziness and headaches. Negative for syncope and weakness.   Hematological: Negative.    Psychiatric/Behavioral: Negative for confusion. The patient " is not nervous/anxious.        Past Medical History:   Diagnosis Date   • Allergic    • Angina pectoris ([facility])    • Anxiety    • Arthritis    • Asthma    • Cataract    • COPD (chronic obstructive pulmonary disease) ([facility])    • Depression    • Essential hypertension 10/16/2019   • Headache    • Heart attack ([facility])    • Hypertension    • Panic attack    • PTSD (post-traumatic stress disorder)    • Restless leg syndrome    • Shortness of breath    • Sinusitis        Allergies   Allergen Reactions   • Codeine Shortness Of Breath   • Fruit Extracts Anaphylaxis     ALLERGIC TO BLUEBERRIES   • Other Anaphylaxis     Blueberries and mustard    • Phenergan [Promethazine Hcl] Anaphylaxis   • Promethazine Anaphylaxis   • Aspirin GI Intolerance   • Ciprofloxacin Nausea And Vomiting   • Metformin Diarrhea   • Uracil Mustard Other (See Comments)   • Vaccinium Angustifolium Other (See Comments)   • Dilaudid [Hydromorphone] Anxiety and Palpitations   • Erythromycin Rash   • Toradol [Ketorolac Tromethamine] Anxiety and Palpitations   • Tramadol Anxiety and Palpitations       Past Surgical History:   Procedure Laterality Date   • CATARACT EXTRACTION, BILATERAL     • EAR TUBES     • HAMMER TOE REPAIR     • KNEE ARTHROSCOPY Right 5/29/2020    Procedure: KNEE ARTHROSCOPY with partial lateral and medial  meniscectomy;  Surgeon: Liu Garcia MD;  Location: [facility] OR;  Service: Orthopedics;  Laterality: Right;   • TONSILLECTOMY AND ADENOIDECTOMY     • WRIST SURGERY Right        Family History   Problem Relation Age of Onset   • Hypertension Mother    • Diabetes Mother        Social History     Socioeconomic History   • Marital status: Single   Tobacco Use   • Smoking status: Never Smoker   • Smokeless tobacco: Never Used   Substance and Sexual Activity   • Alcohol use: Not Currently   • Drug use: Never   • Sexual activity: Defer           Objective   Physical Exam  Vitals and nursing note reviewed.   Constitutional:        General: He is not in acute distress.     Appearance: Normal appearance. He is not ill-appearing.   HENT:      Head: Normocephalic and atraumatic.      Right Ear: Tympanic membrane, ear canal and external ear normal.      Left Ear: Tympanic membrane, ear canal and external ear normal.      Nose: Nose normal.      Mouth/Throat:      Mouth: Mucous membranes are moist.      Pharynx: Oropharynx is clear.   Eyes:      Extraocular Movements: Extraocular movements intact.      Pupils: Pupils are equal, round, and reactive to light.   Cardiovascular:      Rate and Rhythm: Normal rate and regular rhythm.      Pulses: Normal pulses.      Heart sounds: Normal heart sounds. No murmur heard.  Pulmonary:      Effort: Pulmonary effort is normal. No respiratory distress.      Breath sounds: Normal breath sounds.   Abdominal:      General: Bowel sounds are normal.      Palpations: Abdomen is soft.      Tenderness: There is no abdominal tenderness.   Musculoskeletal:         General: Normal range of motion.      Cervical back: Normal range of motion and neck supple.   Skin:     General: Skin is warm and dry.      Capillary Refill: Capillary refill takes less than 2 seconds.   Neurological:      General: No focal deficit present.      Mental Status: He is alert and oriented to person, place, and time.      GCS: GCS eye subscore is 4. GCS verbal subscore is 5. GCS motor subscore is 6.      Cranial Nerves: Cranial nerves are intact.      Sensory: Sensation is intact.      Deep Tendon Reflexes: Reflexes are normal and symmetric.   Psychiatric:         Mood and Affect: Mood normal.         Behavior: Behavior normal.         Procedures           ED Course                                           MDM  Number of Diagnoses or Management Options  Acute intractable tension-type headache  Diagnosis management comments: Patient was placed in a gown prior to assessment.  He is nontoxic-appearing and stable on exam.  Patient is having a hard time  staying awake long enough to answer questions.  He states that he came from home, but presents with a large bag of belongings.  I question that patient is homeless and looking for a place to get some sleep.  He was medicated with droperidol and Benadryl for his migraine.  Lites were done for patient comfort.    Comorbidities: History of migraine  Differentials: Migraine, dehydration, COVID.  Not all inclusive of differentials considered.     Lab Interpretation: Not warranted  Radiology Interpretation: Not warranted    Appropriate PPE worn during exam.    Upon reassessment, patient reports improvement in headache.  He still is very sleepy but is more easily aroused.  There are no signs of neuro defect or deficit.  He is not confused.  I discussed the findings with patient who voices understanding of discharge instructions, signs and symptoms requiring return to the ED; discharged improved and stable condition with follow-up for reevaluation.  Patient verbalizes understanding and is agreeable to the plan of care.  He is able to ambulate upright steadily without assistance upon discharge.  He is in no acute distress.  While exiting the ER, he asked RN if he would be able to sleep in the waiting room.  Security was notified the patient will be resting in the waiting room for a while.    Patient is aware that discharge does not mean that nothing is wrong but it indicates no emergency is present and they must continue care with follow-up as given below or physician of their choice.    This document is intended for medical expert use only.  Reading of this document by patients and/or patient's family without participating medical staff guidance may result in misinterpretation and unintended morbidity.  Any interpretation of such data is the responsibility of the patient and/or family member responsible for the patient in concert with their primary or specialist providers, not to be left for sources of online search as such  as [employer], [employer] or similar queries.  Relying on these approaches to knowledge may result in misinterpretation, misguided goals of care and even death should patient or family members try recommendations outside of the realm of professional medical care in a supervised inpatient environment.    Risk of Complications, Morbidity, and/or Mortality  Presenting problems: high  Diagnostic procedures: high  Management options: high    Patient Progress  Patient progress: stable      Final diagnoses:   Acute intractable tension-type headache       ED Disposition  ED Disposition     ED Disposition   Discharge    Condition   Stable    Comment   --             Summer Can, APRN  [address] [address]  [phone]               Medication List      New Prescriptions    diclofenac 75 MG EC tablet  Commonly known as: VOLTAREN  Take 1 tablet by mouth 2 (Two) Times a Day for 7 days.           Where to Get Your Medications      These medications were sent to [id number] - [facility] [place] - [address] AT [facility] - [phone]  - [phone] FX  [address] [place] [address]    Phone: [phone]   · diclofenac 75 MG EC tablet          Nelli Whalen, APRN  09/13/22 [id number]

## 2022-09-13 NOTE — ED NOTES
Case Management/Social Work    Patient Name:  Vince Urbina  YOB: 1970  MRN: [id number]  Admit Date:  9/13/2022    LSW received call from ER Charge RN regarding patient that was discharge earlier AM hours from emergency room reported to be homeless. Needing shelter assistance. Went down to ER waiting room and met with patient. Patient confirmed he's not COVID vaccinated, does have a working phoen ([phone]). Patient states he filed an application with [employer], pending. States he had an incident 5 years ago that may jeopardize his acceptance, but when LSW inquired further on legal concerns - patient denies any. LSW offfered [facility] and when LSW contacted location for phone assessment, patient was recommended for [place], [place] locations d/t having a [facility] ID. LSW presented with two options by [facility] vs. [facility] Patient requested [facility]. LSW contacted [facility], confirmed bed at location. Informed dinner at 5pm, check-in at 6pm per representative, who collected patient information via phone screening. Denies needs for additional information from LSW. Location: [address] [place] [address]. Patient confirms ability to utilize [employer], looked up directions to take bus #71 to [place] @ [place], then transfer to bus #52. Supplied patient with food/drink, escorted to bus stop on [address].     Electronically signed by:  HIRAL Wesley  09/13/22 12:47 EDT

## 2022-09-13 NOTE — DISCHARGE INSTRUCTIONS
Take Voltaren as needed for headache.  Rest.  Get plenty fluids and avoid dehydrating compounds such as caffeine, coffee, tea and soda.    Follow-up with your primary care provider for further evaluation.  Follow-up with optometry/ophthalmology for vision correction.    Return to the ER for any new or worsening symptoms.

## 2022-09-21 ENCOUNTER — TELEPHONE (OUTPATIENT)
Dept: FAMILY MEDICINE CLINIC | Facility: CLINIC | Age: 52
End: 2022-09-21

## 2022-09-21 ENCOUNTER — DOCUMENTATION (OUTPATIENT)
Dept: FAMILY MEDICINE CLINIC | Facility: CLINIC | Age: 52
End: 2022-09-21

## 2022-09-21 NOTE — TELEPHONE ENCOUNTER
Caller: Vince Urbina    Relationship: Self    Best call back number: [phone]    What form or medical record are you requesting: DOCTOR'S STATEMENT     How would you like to receive the form or medical records (pick-up, mail, fax):     Timeframe paperwork needed: ASAP     Additional notes: PATIENT STATED THAT HE NEEDS FIVE COPIES OF A DOCTOR'S STATEMENT SHOWING THAT HE HAS TYPE 2 DIABETES. PATIENT STATED THAT HE IS NEEDING THIS BY TONIGHT. PATIENT STATED IF HE DOES NOT HAVE IT BY TONIGHT, HE WILL BE KICKED OUT OF THE [employer]. PLEASE ADVISE.

## 2022-10-18 ENCOUNTER — TELEPHONE (OUTPATIENT)
Dept: FAMILY MEDICINE CLINIC | Facility: CLINIC | Age: 52
End: 2022-10-18

## 2022-10-18 NOTE — TELEPHONE ENCOUNTER
Caller: Vince Urbina    Relationship: Self    Best call back number: [phone]    What was the call regarding: PATIENT IS HAVING SURGERY ON Monday 10/24, CAN [facility] FILL OUT THE PRE-OP PAPERWORK/GIVE MEDICAL CLEARANCE WITHOUT THE PATIENT HAVING TO BE SEEN? NO CURRENT PRE-OP VISITS OPEN UNTIL 10/25. IF HE DOES NEED TO BE SEEN, HE WOULD LIKE TO COME IN TOMORROW.     PLEASE ADVISE.     Do you require a callback: YES

## 2022-10-18 NOTE — TELEPHONE ENCOUNTER
She will need to make an office visit for surgical clearance.  I do not have any openings tomorrow for patient.

## 2022-10-21 ENCOUNTER — OFFICE VISIT (OUTPATIENT)
Dept: FAMILY MEDICINE CLINIC | Facility: CLINIC | Age: 52
End: 2022-10-21

## 2022-10-21 VITALS
HEIGHT: 72 IN | TEMPERATURE: 98 F | HEART RATE: 67 BPM | BODY MASS INDEX: 26.33 KG/M2 | SYSTOLIC BLOOD PRESSURE: 112 MMHG | DIASTOLIC BLOOD PRESSURE: 70 MMHG | OXYGEN SATURATION: 98 % | WEIGHT: 194.4 LBS | RESPIRATION RATE: 18 BRPM

## 2022-10-21 DIAGNOSIS — J44.9 CHRONIC OBSTRUCTIVE PULMONARY DISEASE, UNSPECIFIED COPD TYPE: ICD-10-CM

## 2022-10-21 DIAGNOSIS — M25.531 RIGHT WRIST PAIN: ICD-10-CM

## 2022-10-21 DIAGNOSIS — E11.65 TYPE 2 DIABETES MELLITUS WITH HYPERGLYCEMIA, WITHOUT LONG-TERM CURRENT USE OF INSULIN: ICD-10-CM

## 2022-10-21 DIAGNOSIS — I10 ESSENTIAL HYPERTENSION: Primary | ICD-10-CM

## 2022-10-21 LAB — GLUCOSE BLDC GLUCOMTR-MCNC: 293 MG/DL (ref 70–130)

## 2022-10-21 PROCEDURE — 82962 GLUCOSE BLOOD TEST: CPT | Performed by: NURSE PRACTITIONER

## 2022-10-21 PROCEDURE — 99214 OFFICE O/P EST MOD 30 MIN: CPT | Performed by: NURSE PRACTITIONER

## 2022-10-21 RX ORDER — BLOOD-GLUCOSE METER
1 KIT MISCELLANEOUS 3 TIMES DAILY
Qty: 1 KIT | Refills: 11 | Status: SHIPPED | OUTPATIENT
Start: 2022-10-21

## 2022-10-21 RX ORDER — LANCETS
EACH MISCELLANEOUS
Qty: 100 EACH | Refills: 12 | Status: SHIPPED | OUTPATIENT
Start: 2022-10-21

## 2022-10-21 RX ORDER — GLIPIZIDE 5 MG/1
5 TABLET ORAL
Qty: 60 TABLET | Refills: 2 | Status: SHIPPED | OUTPATIENT
Start: 2022-10-21

## 2022-10-21 NOTE — ASSESSMENT & PLAN NOTE
Patient is to be scheduled for a right wrist fusion, for October 24, 2022.  He is here today for medical clearance.  I am unable to clear this patient for surgery related to uncontrolled diabetes mellitus.  Patient is not taking diabetic medication reports he is controlling this himself.  Last A1c was 10 blood glucose check today was 293.    Patient has been referred to endocrinology and he is not currently seeing them.  Patient is unable to take metformin related to side effects.  I will send patient in glipizide 5 mg twice a day.  Patient has been educated to follow-up with endocrinology.  We will check labs at his next follow-up appointment.

## 2022-10-21 NOTE — ASSESSMENT & PLAN NOTE
Diabetes is worsening.   Reminded to bring in blood sugar diary at next visit.  Dietary recommendations for ADA diet.  Regular aerobic exercise.  Discussed ways to avoid symptomatic hypoglycemia.  Discussed sick day management.  Discussed foot care.  Diabetes will be reassessed one week .

## 2022-10-21 NOTE — PROGRESS NOTES
"Chief Complaint  Medical Clearance (Surgery on Monday)    Subjective        Vince Urbina presents to [facility]  History of Present Illness  Patient presents office today for medical surgical clearance that he has scheduled for October 24, 2022.  Patient has right wrist pain patient is scheduled for right wrist fusion.  Patient has been checked for EKG and chest x-ray which have been normal.  Patient's A1c is out of control last check was in July.  A1c result is 10 I will check a blood sugar today.  Blood pressures controlled 112/70.  He denies chest pain shortness of air.  Patient states that he is controlling diabetes without medication working on diet and exercise.  Patient has COPD as is taking inhalers.      Objective   Vital Signs:  /70 (BP Location: Left arm, Patient Position: Sitting)   Pulse 67   Temp 98 °F (36.7 °C) (Infrared)   Resp 18   Ht 182.9 cm (72.01\")   Wt 88.2 kg (194 lb 6.4 oz)   SpO2 98%   BMI 26.36 kg/m²   Estimated body mass index is 26.36 kg/m² as calculated from the following:    Height as of this encounter: 182.9 cm (72.01\").    Weight as of this encounter: 88.2 kg (194 lb 6.4 oz).    BMI is >= 25 and <30. (Overweight) The following options were offered after discussion;: weight loss educational material (shared in after visit summary), exercise counseling/recommendations and nutrition counseling/recommendations      Physical Exam  Constitutional:       General: He is not in acute distress.     Appearance: Normal appearance.   Eyes:      Pupils: Pupils are equal, round, and reactive to light.   Cardiovascular:      Rate and Rhythm: Normal rate and regular rhythm.      Pulses: Normal pulses.      Heart sounds: Normal heart sounds.   Pulmonary:      Effort: Pulmonary effort is normal.      Breath sounds: Normal breath sounds. No wheezing or rales.   Musculoskeletal:      Right wrist: Tenderness present. Decreased range of motion.      Left wrist: " Normal.   Neurological:      Mental Status: He is alert.   Psychiatric:         Mood and Affect: Mood normal.         Behavior: Behavior normal.        Result Review :  The following data was reviewed by: LISET Hood on 10/21/2022:  Common labs    Common Labs 1/15/22 7/19/22 7/19/22 7/19/22 7/19/22 7/19/22 9/6/22 9/6/22     1436 1436 1436 1436 1436 2038 2038   Glucose      379 (A)  314 (A)   BUN      9  17   Creatinine      0.89  0.82   Sodium      135 (A)  137   Potassium      4.1  3.8   Chloride      99  99   Calcium      9.7  9.6   Albumin      4.40  4.50   Total Bilirubin      0.4  0.5   Alkaline Phosphatase      98  105   AST (SGOT)      28  23   ALT (SGPT)      37  33   WBC 6.97 9.10     8.68    Hemoglobin 17.0 16.0     15.0    Hematocrit 48.5 48.8     42.5    Platelets 175 245     236    Total Cholesterol     161      Triglycerides     233 (A)      HDL Cholesterol     49      LDL Cholesterol      74      Hemoglobin A1C    10.60 (A)       Microalbumin, Urine   20.0        (A) Abnormal value            A1C Last 3 Results    HGBA1C Last 3 Results 7/19/22   Hemoglobin A1C 10.60 (A)   (A) Abnormal value                     Assessment and Plan   Diagnoses and all orders for this visit:    1. Essential hypertension (Primary)  Assessment & Plan:  Blood pressure stable      2. Chronic obstructive pulmonary disease, unspecified COPD type (HCC)  Assessment & Plan:  Stable          3. Right wrist pain  Assessment & Plan:  Patient is to be scheduled for a right wrist fusion, for October 24, 2022.  He is here today for medical clearance.  I am unable to clear this patient for surgery related to uncontrolled diabetes mellitus.  Patient is not taking diabetic medication reports he is controlling this himself.  Last A1c was 10 blood glucose check today was 293.    Patient has been referred to endocrinology and he is not currently seeing them.  Patient is unable to take metformin related to side effects.  I will  send patient in glipizide 5 mg twice a day.  Patient has been educated to follow-up with endocrinology.  We will check labs at his next follow-up appointment.        4. Type 2 diabetes mellitus with hyperglycemia, without long-term current use of insulin ([facility])  -     POC Glucose  -     glipizide (Glucotrol) 5 MG tablet; Take 1 tablet by mouth 2 (Two) Times a Day Before Meals.  Dispense: 60 tablet; Refill: 2  -     Blood Glucose Monitoring Suppl (OneTouch Verio Sync System) w/Device kit; 1 kit 3 (Three) Times a Day.  Dispense: 1 kit; Refill: 11  -     glucose blood test strip; Use as instructed  Dispense: 100 each; Refill: 12  -     Lancets (accu-chek multiclix) lancets; Check blood sugar 3 times a day  Dispense: 100 each; Refill: 12         I spent 30 minutes caring for Vince on this date of service. This time includes time spent by me in the following activities:preparing for the visit, reviewing tests, obtaining and/or reviewing a separately obtained history, performing a medically appropriate examination and/or evaluation , counseling and educating the patient/family/caregiver, ordering medications, tests, or procedures, referring and communicating with other health care professionals , documenting information in the medical record, independently interpreting results and communicating that information with the patient/family/caregiver and care coordination  Follow Up   Return in about 2 weeks (around 11/4/2022) for Recheck.  Patient was given instructions and counseling regarding his condition or for health maintenance advice. Please see specific information pulled into the AVS if appropriate.

## 2022-10-25 ENCOUNTER — TELEPHONE (OUTPATIENT)
Dept: FAMILY MEDICINE CLINIC | Facility: CLINIC | Age: 52
End: 2022-10-25

## 2022-11-14 NOTE — TELEPHONE ENCOUNTER
Please advise patient he will need to make an office visit I was checking office notes but I will need to see him again to discuss this and office notes for documentation.  Thank you

## 2022-12-03 ENCOUNTER — APPOINTMENT (OUTPATIENT)
Dept: GENERAL RADIOLOGY | Facility: HOSPITAL | Age: 52
End: 2022-12-03

## 2022-12-03 ENCOUNTER — HOSPITAL ENCOUNTER (EMERGENCY)
Facility: HOSPITAL | Age: 52
Discharge: HOME | End: 2022-12-03
Attending: EMERGENCY MEDICINE | Admitting: EMERGENCY MEDICINE

## 2022-12-03 VITALS
HEIGHT: 72 IN | OXYGEN SATURATION: 98 % | SYSTOLIC BLOOD PRESSURE: 135 MMHG | RESPIRATION RATE: 18 BRPM | TEMPERATURE: 97.6 F | DIASTOLIC BLOOD PRESSURE: 92 MMHG | HEART RATE: 80 BPM | WEIGHT: 194 LBS | BODY MASS INDEX: 26.28 KG/M2

## 2022-12-03 DIAGNOSIS — J06.9 VIRAL URI: Primary | ICD-10-CM

## 2022-12-03 DIAGNOSIS — M25.572 ACUTE LEFT ANKLE PAIN: ICD-10-CM

## 2022-12-03 LAB
FLUAV SUBTYP SPEC NAA+PROBE: NOT DETECTED
FLUBV RNA ISLT QL NAA+PROBE: NOT DETECTED

## 2022-12-03 PROCEDURE — 99283 EMERGENCY DEPT VISIT LOW MDM: CPT

## 2022-12-03 PROCEDURE — 73610 X-RAY EXAM OF ANKLE: CPT

## 2022-12-03 PROCEDURE — 87502 INFLUENZA DNA AMP PROBE: CPT | Performed by: EMERGENCY MEDICINE

## 2022-12-03 RX ORDER — NAPROXEN 375 MG/1
375 TABLET ORAL 2 TIMES DAILY PRN
Qty: 14 TABLET | Refills: 0 | Status: SHIPPED | OUTPATIENT
Start: 2022-12-03

## 2022-12-03 NOTE — ED PROVIDER NOTES
Subjective   History of Present Illness  Patient is a 52-year male with cough congestion for the past several days.  Nuys fever chest pain or other complaint.  He does have left ankle pain which is chronic but worse recently.  States he injured it several months ago.        Review of Systems   Constitutional: Negative for chills and fever.   HENT: Positive for congestion. Negative for sore throat.    Eyes: Negative for visual disturbance.   Respiratory: Positive for cough. Negative for shortness of breath.    Cardiovascular: Negative for chest pain.   Gastrointestinal: Negative for abdominal pain, diarrhea and vomiting.   Endocrine: Negative for polyuria.   Genitourinary: Negative for dysuria and flank pain.   Musculoskeletal: Negative for back pain.   Neurological: Negative for dizziness and headaches.       Past Medical History:   Diagnosis Date   • Allergic    • Angina pectoris (Formerly [facility])    • Anxiety    • Arthritis    • Asthma    • Cataract    • COPD (chronic obstructive pulmonary disease) (Formerly [facility])    • Depression    • Essential hypertension 10/16/2019   • Headache    • Heart attack ([facility])    • Hypertension    • Panic attack    • PTSD (post-traumatic stress disorder)    • Restless leg syndrome    • Shortness of breath    • Sinusitis        Allergies   Allergen Reactions   • Codeine Shortness Of Breath   • Fruit Extracts Anaphylaxis     ALLERGIC TO BLUEBERRIES   • Other Anaphylaxis     Blueberries and mustard    • Phenergan [Promethazine Hcl] Anaphylaxis   • Promethazine Anaphylaxis   • Aspirin GI Intolerance   • Ciprofloxacin Nausea And Vomiting   • Metformin Diarrhea   • Uracil Mustard Other (See Comments)   • Vaccinium Angustifolium Other (See Comments)   • Dilaudid [Hydromorphone] Anxiety and Palpitations   • Erythromycin Rash   • Toradol [Ketorolac Tromethamine] Anxiety and Palpitations   • Tramadol Anxiety and Palpitations       Past Surgical History:   Procedure Laterality Date   • CATARACT EXTRACTION, BILATERAL      • EAR TUBES     • HAMMER TOE REPAIR     • KNEE ARTHROSCOPY Right 5/29/2020    Procedure: KNEE ARTHROSCOPY with partial lateral and medial  meniscectomy;  Surgeon: Liu Garcia MD;  Location: [facility] MAIN OR;  Service: Orthopedics;  Laterality: Right;   • TONSILLECTOMY AND ADENOIDECTOMY     • WRIST SURGERY Right        Family History   Problem Relation Age of Onset   • Hypertension Mother    • Diabetes Mother        Social History     Socioeconomic History   • Marital status: Single   Tobacco Use   • Smoking status: Never   • Smokeless tobacco: Never   Substance and Sexual Activity   • Alcohol use: Not Currently   • Drug use: Never   • Sexual activity: Defer           Objective   Physical Exam  HEENT exam shows TMs to be clear.  Oropharynx comers but sclera nonicteric.  Neck has no adenopathy JVD or bruits.  Lungs clear.  Heart has regular rhythm.  Ab soft.  Extremities M shows pain of the patient has left ankle but is no swelling ecchymosis deformity.  Has 2+ pulses.  Procedures           ED Course      Results for orders placed or performed during the hospital encounter of 12/03/22   Influenza Antigen, Rapid - Swab, Nasopharynx    Specimen: Nasopharynx; Swab   Result Value Ref Range    Influenza A PCR Not Detected Not Detected    Influenza B PCR Not Detected Not Detected     XR Ankle 3+ View Left    Result Date: 12/3/2022   1. No acute fracture or dislocation. 2. Soft tissue swelling over the lateral malleolus.  Electronically Signed By-Santy Felix MD On:12/3/2022 9:48 AM This report was finalized on [id number] by  Santy Felix MD.                                           MDM  Number of Diagnoses or Management Options  Diagnosis management comments: Patient is findings consistent with viral URI.  X-ray of his left ankle shows no acute injury.  Patient will be discharged with a prescription for Naprosyn.  See his MD for recheck.       Amount and/or Complexity of Data Reviewed  Clinical lab tests:  reviewed  Tests in the radiology section of CPT®: reviewed    Risk of Complications, Morbidity, and/or Mortality  Presenting problems: moderate  Diagnostic procedures: moderate  Management options: moderate    Patient Progress  Patient progress: stable      Final diagnoses:   Viral URI   Acute left ankle pain       ED Disposition  ED Disposition     ED Disposition   Discharge    Condition   Stable    Comment   --             No follow-up provider specified.       Medication List      New Prescriptions    naproxen 375 MG tablet  Commonly known as: NAPROSYN  Take 1 tablet by mouth 2 (Two) Times a Day As Needed for Moderate Pain.           Where to Get Your Medications      Information about where to get these medications is not yet available    Ask your nurse or doctor about these medications  · naproxen 375 MG tablet          Pardeep Zaldivar MD  12/03/22 [id number]

## 2023-02-27 ENCOUNTER — HOSPITAL ENCOUNTER (EMERGENCY)
Facility: HOSPITAL | Age: 53
Discharge: HOME | End: 2023-02-27
Attending: EMERGENCY MEDICINE | Admitting: EMERGENCY MEDICINE
Payer: MEDICARE

## 2023-02-27 ENCOUNTER — APPOINTMENT (OUTPATIENT)
Dept: GENERAL RADIOLOGY | Facility: HOSPITAL | Age: 53
End: 2023-02-27
Payer: MEDICARE

## 2023-02-27 VITALS
OXYGEN SATURATION: 97 % | HEIGHT: 72 IN | SYSTOLIC BLOOD PRESSURE: 145 MMHG | RESPIRATION RATE: 18 BRPM | BODY MASS INDEX: 25.06 KG/M2 | HEART RATE: 81 BPM | DIASTOLIC BLOOD PRESSURE: 91 MMHG | WEIGHT: 185 LBS | TEMPERATURE: 97.1 F

## 2023-02-27 DIAGNOSIS — S43.401A SPRAIN OF RIGHT SHOULDER, UNSPECIFIED SHOULDER SPRAIN TYPE, INITIAL ENCOUNTER: Primary | ICD-10-CM

## 2023-02-27 DIAGNOSIS — S40.011A CONTUSION OF RIGHT SHOULDER, INITIAL ENCOUNTER: ICD-10-CM

## 2023-02-27 PROCEDURE — 73030 X-RAY EXAM OF SHOULDER: CPT

## 2023-02-27 PROCEDURE — 99283 EMERGENCY DEPT VISIT LOW MDM: CPT

## 2023-08-03 ENCOUNTER — HOSPITAL ENCOUNTER (EMERGENCY)
Facility: HOSPITAL | Age: 53
Discharge: HOME | End: 2023-08-04
Attending: EMERGENCY MEDICINE
Payer: MEDICARE

## 2023-08-03 ENCOUNTER — APPOINTMENT (OUTPATIENT)
Dept: GENERAL RADIOLOGY | Facility: HOSPITAL | Age: 53
End: 2023-08-03
Payer: MEDICARE

## 2023-08-03 DIAGNOSIS — R07.9 CHEST PAIN AT REST: Primary | ICD-10-CM

## 2023-08-03 DIAGNOSIS — G89.4 CHRONIC PAIN SYNDROME: ICD-10-CM

## 2023-08-03 LAB
BASOPHILS # BLD AUTO: 0.07 10*3/MM3 (ref 0–0.2)
BASOPHILS NFR BLD AUTO: 0.8 % (ref 0–1.5)
DEPRECATED RDW RBC AUTO: 40.6 FL (ref 37–54)
EOSINOPHIL # BLD AUTO: 0.22 10*3/MM3 (ref 0–0.4)
EOSINOPHIL NFR BLD AUTO: 2.5 % (ref 0.3–6.2)
ERYTHROCYTE [DISTWIDTH] IN BLOOD BY AUTOMATED COUNT: 13.1 % (ref 12.3–15.4)
HCT VFR BLD AUTO: 39.2 % (ref 37.5–51)
HGB BLD-MCNC: 13.4 G/DL (ref 13–17.7)
IMM GRANULOCYTES # BLD AUTO: 0.03 10*3/MM3 (ref 0–0.05)
IMM GRANULOCYTES NFR BLD AUTO: 0.3 % (ref 0–0.5)
LYMPHOCYTES # BLD AUTO: 2.77 10*3/MM3 (ref 0.7–3.1)
LYMPHOCYTES NFR BLD AUTO: 32.1 % (ref 19.6–45.3)
MCH RBC QN AUTO: 29.8 PG (ref 26.6–33)
MCHC RBC AUTO-ENTMCNC: 34.2 G/DL (ref 31.5–35.7)
MCV RBC AUTO: 87.1 FL (ref 79–97)
MONOCYTES # BLD AUTO: 0.52 10*3/MM3 (ref 0.1–0.9)
MONOCYTES NFR BLD AUTO: 6 % (ref 5–12)
NEUTROPHILS NFR BLD AUTO: 5.02 10*3/MM3 (ref 1.7–7)
NEUTROPHILS NFR BLD AUTO: 58.3 % (ref 42.7–76)
NRBC BLD AUTO-RTO: 0 /100 WBC (ref 0–0.2)
PLATELET # BLD AUTO: 167 10*3/MM3 (ref 140–450)
PMV BLD AUTO: 10 FL (ref 6–12)
RBC # BLD AUTO: 4.5 10*6/MM3 (ref 4.14–5.8)
WBC NRBC COR # BLD: 8.63 10*3/MM3 (ref 3.4–10.8)

## 2023-08-03 PROCEDURE — 36415 COLL VENOUS BLD VENIPUNCTURE: CPT

## 2023-08-03 PROCEDURE — 85025 COMPLETE CBC W/AUTO DIFF WBC: CPT | Performed by: EMERGENCY MEDICINE

## 2023-08-03 PROCEDURE — 83735 ASSAY OF MAGNESIUM: CPT | Performed by: EMERGENCY MEDICINE

## 2023-08-03 PROCEDURE — 99284 EMERGENCY DEPT VISIT MOD MDM: CPT

## 2023-08-03 PROCEDURE — 71045 X-RAY EXAM CHEST 1 VIEW: CPT

## 2023-08-03 PROCEDURE — 83690 ASSAY OF LIPASE: CPT | Performed by: EMERGENCY MEDICINE

## 2023-08-03 PROCEDURE — 84484 ASSAY OF TROPONIN QUANT: CPT | Performed by: EMERGENCY MEDICINE

## 2023-08-03 PROCEDURE — 80053 COMPREHEN METABOLIC PANEL: CPT | Performed by: EMERGENCY MEDICINE

## 2023-08-03 PROCEDURE — 93005 ELECTROCARDIOGRAM TRACING: CPT

## 2023-08-03 PROCEDURE — 93010 ELECTROCARDIOGRAM REPORT: CPT | Performed by: INTERNAL MEDICINE

## 2023-08-03 RX ORDER — SODIUM CHLORIDE 0.9 % (FLUSH) 0.9 %
10 SYRINGE (ML) INJECTION AS NEEDED
Status: DISCONTINUED | OUTPATIENT
Start: 2023-08-03 | End: 2023-08-04 | Stop reason: HOSPADM

## 2023-08-04 ENCOUNTER — APPOINTMENT (OUTPATIENT)
Dept: GENERAL RADIOLOGY | Facility: HOSPITAL | Age: 53
End: 2023-08-04
Payer: MEDICARE

## 2023-08-04 VITALS
TEMPERATURE: 97.6 F | WEIGHT: 185 LBS | OXYGEN SATURATION: 99 % | HEART RATE: 71 BPM | RESPIRATION RATE: 20 BRPM | HEIGHT: 72 IN | SYSTOLIC BLOOD PRESSURE: 113 MMHG | BODY MASS INDEX: 25.06 KG/M2 | DIASTOLIC BLOOD PRESSURE: 71 MMHG

## 2023-08-04 LAB
ALBUMIN SERPL-MCNC: 3.7 G/DL (ref 3.5–5.2)
ALBUMIN/GLOB SERPL: 1.7 G/DL
ALP SERPL-CCNC: 65 U/L (ref 39–117)
ALT SERPL W P-5'-P-CCNC: 23 U/L (ref 1–41)
ANION GAP SERPL CALCULATED.3IONS-SCNC: 9 MMOL/L (ref 5–15)
AST SERPL-CCNC: 16 U/L (ref 1–40)
BILIRUB SERPL-MCNC: 0.3 MG/DL (ref 0–1.2)
BUN SERPL-MCNC: 13 MG/DL (ref 6–20)
BUN/CREAT SERPL: 16.7 (ref 7–25)
CALCIUM SPEC-SCNC: 7.7 MG/DL (ref 8.6–10.5)
CHLORIDE SERPL-SCNC: 107 MMOL/L (ref 98–107)
CO2 SERPL-SCNC: 24 MMOL/L (ref 22–29)
CREAT SERPL-MCNC: 0.78 MG/DL (ref 0.76–1.27)
EGFRCR SERPLBLD CKD-EPI 2021: 107.3 ML/MIN/1.73
GLOBULIN UR ELPH-MCNC: 2.2 GM/DL
GLUCOSE SERPL-MCNC: 193 MG/DL (ref 65–99)
LIPASE SERPL-CCNC: 33 U/L (ref 13–60)
MAGNESIUM SERPL-MCNC: 1.7 MG/DL (ref 1.6–2.6)
POTASSIUM SERPL-SCNC: 3 MMOL/L (ref 3.5–5.2)
PROT SERPL-MCNC: 5.9 G/DL (ref 6–8.5)
QT INTERVAL: 407 MS
SODIUM SERPL-SCNC: 140 MMOL/L (ref 136–145)
TROPONIN T SERPL HS-MCNC: 12 NG/L
TROPONIN T SERPL HS-MCNC: 9 NG/L

## 2023-08-04 PROCEDURE — 84484 ASSAY OF TROPONIN QUANT: CPT | Performed by: EMERGENCY MEDICINE

## 2023-08-04 PROCEDURE — 73610 X-RAY EXAM OF ANKLE: CPT

## 2023-08-04 RX ORDER — ALUMINA, MAGNESIA, AND SIMETHICONE 2400; 2400; 240 MG/30ML; MG/30ML; MG/30ML
15 SUSPENSION ORAL ONCE
Status: COMPLETED | OUTPATIENT
Start: 2023-08-04 | End: 2023-08-04

## 2023-08-04 RX ORDER — ACETAMINOPHEN 500 MG
1000 TABLET ORAL ONCE
Status: COMPLETED | OUTPATIENT
Start: 2023-08-04 | End: 2023-08-04

## 2023-08-04 RX ORDER — LIDOCAINE HYDROCHLORIDE 20 MG/ML
15 SOLUTION OROPHARYNGEAL ONCE
Status: COMPLETED | OUTPATIENT
Start: 2023-08-04 | End: 2023-08-04

## 2023-08-04 RX ADMIN — ACETAMINOPHEN 1000 MG: 500 TABLET, FILM COATED ORAL at 01:58

## 2023-08-04 RX ADMIN — LIDOCAINE HYDROCHLORIDE 15 ML: 20 SOLUTION ORAL; TOPICAL at 01:58

## 2023-08-04 RX ADMIN — ALUMINUM HYDROXIDE, MAGNESIUM HYDROXIDE, AND DIMETHICONE 15 ML: 400; 400; 40 SUSPENSION ORAL at 01:58

## 2023-08-04 NOTE — ED NOTES
"Pt reports right sided chest pain that has been going on \"for awhile.\" But tonight it got worse \"it became unbearable.\" Pt reports having SOA all the time due to his COPD and Asthma, and also reports being legally blind.  "

## 2023-08-04 NOTE — ED PROVIDER NOTES
" EMERGENCY DEPARTMENT ENCOUNTER    Room Number:  17/17  Date seen:  8/10/2023  PCP: Provider, No Known  Historian: Patient  Relevant information provided by sources other than the patient, review of external records, and social determinants of health may be included in the HPI section.      HPI:  Chief Complaint: Chest pain    Additional historical features obtained directly from: No one  Context: Vince Urbina is a 53 y.o. male who presents to the ED c/o patient complains of constant substernal chest pain which is burning for the last several days.  Nothing makes it better or worse, he has a history of \"heart attack\", but I cannot find any substantiation of this in the medical record.  Patient also complains of severe and chronic left ankle pain which has been a problem now for some time.        Initial impression including social determinants which will impact the assessment patient is been seeing at several outlying facilities for similar symptoms over the years.  Does have a history of drug and alcohol abuse and personality disorder          PAST MEDICAL HISTORY  Active Ambulatory Problems     Diagnosis Date Noted    Essential hypertension 10/16/2019    PTSD (post-traumatic stress disorder) 10/16/2019    Anxiety 10/16/2019    COPD (chronic obstructive pulmonary disease) 10/16/2019    Tear of medial meniscus of right knee, current 05/19/2020    Multiple food allergies 07/13/2022    Encounter for hepatitis C screening test for low risk patient 07/19/2022    Hyperlipidemia 07/19/2022    Seasonal allergies 07/19/2022    Type 2 diabetes mellitus without complication, without long-term current use of insulin 07/19/2022    Right wrist pain 10/21/2022     Resolved Ambulatory Problems     Diagnosis Date Noted    No Resolved Ambulatory Problems     Past Medical History:   Diagnosis Date    Allergic     Angina pectoris     Arthritis     Asthma     Cataract     Depression     Headache     Heart attack     " Hypertension     Panic attack     Restless leg syndrome     Shortness of breath     Sinusitis          PAST SURGICAL HISTORY  Past Surgical History:   Procedure Laterality Date    CATARACT EXTRACTION, BILATERAL      EAR TUBES      HAMMER TOE REPAIR      KNEE ARTHROSCOPY Right 5/29/2020    Procedure: KNEE ARTHROSCOPY with partial lateral and medial  meniscectomy;  Surgeon: Liu Garcia MD;  Location: [facility] MAIN OR;  Service: Orthopedics;  Laterality: Right;    TONSILLECTOMY AND ADENOIDECTOMY      WRIST SURGERY Right          FAMILY HISTORY  Family History   Problem Relation Age of Onset    Hypertension Mother     Diabetes Mother          SOCIAL HISTORY  Social History     Socioeconomic History    Marital status:    Tobacco Use    Smoking status: Never    Smokeless tobacco: Never   Substance and Sexual Activity    Alcohol use: Not Currently    Drug use: Never    Sexual activity: Defer         ALLERGIES  Codeine, Fruit extracts, Other, Phenergan [promethazine hcl], Promethazine, Aspirin, Ciprofloxacin, Metformin, Uracil mustard, Vaccinium angustifolium, Dilaudid [hydromorphone], Erythromycin, Toradol [ketorolac tromethamine], and Tramadol          PHYSICAL EXAM  ED Triage Vitals [08/03/23 2304]   Temp Heart Rate Resp BP SpO2   97.6 øF (36.4 øC) 80 20 136/82 96 %      Temp src Heart Rate Source Patient Position BP Location FiO2 (%)   -- -- -- -- --         Physical Exam      GENERAL: Awake and alert, somewhat confrontational and difficult  HENT: nares patent  EYES: no scleral icterus, PERRL, EOMI  CV: regular rhythm, normal rate, distal pulses symmetric and palpable  RESPIRATORY: normal effort  ABDOMEN: soft, nondistended nontender with normal bowel sound  MUSCULOSKELETAL: no deformity, left ankle shows some chronic arthritic type changes and some mild pain with range of motion but does not appear to be acutely injured or inflamed  NEURO: alert, moves all extremities, follows  "commands  PSYCH:  calm, cooperative  SKIN: warm, dry with no rash        LAB RESULTS  No results found for this or any previous visit (from the past 24 hour(s)).      Ordered the above labs and my independent interpretation of these results are discussed in the section labeled \"ED course\" below        RADIOLOGY  No Radiology Exams Resulted Within Past 24 Hours    Ordered the above noted radiological studies.  Independently interpreted by me in PACS.  My independent interpretation of these results are discussed in the section below labeled \"ED course\"        PROCEDURES  Procedures          MEDICATIONS GIVEN IN ER  Medications   acetaminophen (TYLENOL) tablet 1,000 mg (1,000 mg Oral Given 8/4/23 0158)   aluminum-magnesium hydroxide-simethicone (MAALOX MAX) 400-400-40 MG/5ML suspension 15 mL (15 mL Oral Given 8/4/23 0158)   Lidocaine Viscous HCl (XYLOCAINE) 2 % solution 15 mL (15 mL Mouth/Throat Given 8/4/23 0158)                   MEDICAL DECISION MAKING and INDEPENDENT INTERPRETATIONS      Everything documented below this statement is the \"ED course\" section which I have referred to above.  Everything discussed in the following section constitutes MY INDEPENDENT INTERPRETATIONS of the lab work, EKGs, and radiology studies, and all of my personal conversations with other providers, and all of my independent decision making regarding this patient are discussed below.      ED Course as of 08/10/23 1733   Thu Aug 10, 2023   1731 CBC is unremarkable [DP]   1731 Chemistry shows normal renal function and glucose of 193 [DP]   1732 Lipase is normal and this does not appear to be pancreatitis [DP]   1732 High-sensitivity troponin 9 and 12 respectively and does not represent any acute ischemic changes. [DP]   1732 Chest x-ray shows nothing acute [DP]   1732 EKG shows normal sinus rhythm at 65  Normal [place], QRS and QT  No acute ST segment changes are seen to suggest ischemia, there is no change compared to previous dated " "September 6, 2022. [DP]   1732 Patient has heart score 4, and that including his reported history of heart disease although I do not see any documentation of that.  And that his pain is very atypical and likely GI related.  There is no need for admission to the hospital at this time and he can follow-up in the outpatient setting [DP]      ED Course User Index  [DP] Gabriel Garcia MD         Everything documented above with this statement, in the ED course section constitutes my independent interpretation of lab work EKG and radiology studies along with my personal conversations with other providers and my independent medical decision making.  This statement will not be repeated before each data point, but they are all my independent interpretation needs contained within the \"ED course\" section.             All appropriate hygiene and PPE requirements were satisfied with this patient encounter      FINAL DIAGNOSES  Final diagnoses:   Chest pain at rest   Chronic pain syndrome           DISPOSITION  Discharge          Latest Documented Vital Signs:  As of 17:33 EDT  BP- 113/71 HR- 71 Temp- 97.6 øF (36.4 øC) O2 sat- 99%        --    Please note that portions of this were completed with a voice recognition program.       Note Disclaimer: At [facility], we believe that sharing information builds trust and better relationships. You are receiving this note because you are receiving care at [facility] or recently visited. It is possible you will see health information before a provider has talked with you about it. This kind of information can be easy to misunderstand. To help you fully understand what it      Gabriel Garcia MD  08/10/23 1733    "

## 2023-08-04 NOTE — ED TRIAGE NOTES
Pt to triage via ems   Pt reports midsternal chest pain and ankle that started around 10pm. Pt reports hx of MI unsure of when. Pt received 324 ASA 1 nitro en route with no improvement of pain. Pt denies recent  injury to ankle.

## (undated) DEVICE — PK PROC TURNOVER

## (undated) DEVICE — GAUZE,SPONGE,FLUFF,6"X6.75",STRL,5/TRAY: Brand: MEDLINE

## (undated) DEVICE — PAD UNDERCAST WYTEX 4IN 4YD LF STRL

## (undated) DEVICE — 3M™ STERI-STRIP™ REINFORCED ADHESIVE SKIN CLOSURES, R1547, 1/2 IN X 4 IN (12 MM X 100 MM), 6 STRIPS/ENVELOPE: Brand: 3M™ STERI-STRIP™

## (undated) DEVICE — COVER,MAYO STAND,STERILE: Brand: MEDLINE

## (undated) DEVICE — GLV SURG SIGNATURE ESSENTIAL PF LTX SZ8

## (undated) DEVICE — CUFF SCD HEMOFORCE SEQ CALF STD MD

## (undated) DEVICE — BLD SHAVER EXCALIBUR CRV 4MM

## (undated) DEVICE — GLV SURG DERMASSURE GRN LF PF 8.5

## (undated) DEVICE — BNDG ELAS ELITE V/CLOSE 6IN 5YD LF STRL

## (undated) DEVICE — UNDYED BRAIDED (POLYGLACTIN 910), SYNTHETIC ABSORBABLE SUTURE: Brand: COATED VICRYL

## (undated) DEVICE — TBG ARTHSCP PUMP W CONN/REDUC 8FT

## (undated) DEVICE — TOWEL,OR,DSP,ST,WHITE,DLX,4/PK,20PK/CS: Brand: MEDLINE

## (undated) DEVICE — NDL HYPO PRECISIONGLIDE REG 22G 1 1/2

## (undated) DEVICE — GLV SURG SIGNATURE ESSENTIAL PF LTX SZ8.5

## (undated) DEVICE — SOL IRRIG H2O 1000ML STRL

## (undated) DEVICE — NDL HYPO PRECISIONGLIDE/REG 18G 11/2 PNK

## (undated) DEVICE — TBG ARTHSCP DUALWAVE

## (undated) DEVICE — GLV SURG SENSICARE PI ORTHO SZ8 LF STRL

## (undated) DEVICE — DRAPE SHEET ULTRAGARD: Brand: MEDLINE

## (undated) DEVICE — PAD,ABDOMINAL,5"X9",STERILE,LF,1/PK: Brand: MEDLINE INDUSTRIES, INC.

## (undated) DEVICE — TBG ARTHSCP PT W CONN/REDUC 8FT

## (undated) DEVICE — PK KN ARTHROSCOPY 50